# Patient Record
Sex: MALE | Race: BLACK OR AFRICAN AMERICAN | NOT HISPANIC OR LATINO | URBAN - METROPOLITAN AREA
[De-identification: names, ages, dates, MRNs, and addresses within clinical notes are randomized per-mention and may not be internally consistent; named-entity substitution may affect disease eponyms.]

---

## 2021-06-30 ENCOUNTER — EMERGENCY (EMERGENCY)
Facility: HOSPITAL | Age: 31
LOS: 0 days | Discharge: HOME | End: 2021-06-30
Attending: EMERGENCY MEDICINE | Admitting: EMERGENCY MEDICINE
Payer: COMMERCIAL

## 2021-06-30 VITALS
DIASTOLIC BLOOD PRESSURE: 58 MMHG | HEART RATE: 84 BPM | TEMPERATURE: 98 F | SYSTOLIC BLOOD PRESSURE: 126 MMHG | WEIGHT: 291.89 LBS

## 2021-06-30 VITALS
TEMPERATURE: 98 F | OXYGEN SATURATION: 96 % | WEIGHT: 300.05 LBS | SYSTOLIC BLOOD PRESSURE: 126 MMHG | HEIGHT: 71 IN | DIASTOLIC BLOOD PRESSURE: 78 MMHG | HEART RATE: 100 BPM | RESPIRATION RATE: 20 BRPM

## 2021-06-30 DIAGNOSIS — R35.8 OTHER POLYURIA: ICD-10-CM

## 2021-06-30 DIAGNOSIS — E11.9 TYPE 2 DIABETES MELLITUS WITHOUT COMPLICATIONS: ICD-10-CM

## 2021-06-30 DIAGNOSIS — R10.9 UNSPECIFIED ABDOMINAL PAIN: ICD-10-CM

## 2021-06-30 DIAGNOSIS — F17.200 NICOTINE DEPENDENCE, UNSPECIFIED, UNCOMPLICATED: ICD-10-CM

## 2021-06-30 DIAGNOSIS — E11.65 TYPE 2 DIABETES MELLITUS WITH HYPERGLYCEMIA: ICD-10-CM

## 2021-06-30 LAB
ALBUMIN SERPL ELPH-MCNC: 4.4 G/DL — SIGNIFICANT CHANGE UP (ref 3.5–5.2)
ALP SERPL-CCNC: 100 U/L — SIGNIFICANT CHANGE UP (ref 30–115)
ALT FLD-CCNC: 36 U/L — SIGNIFICANT CHANGE UP (ref 0–41)
ANION GAP SERPL CALC-SCNC: 11 MMOL/L — SIGNIFICANT CHANGE UP (ref 7–14)
ANION GAP SERPL CALC-SCNC: 14 MMOL/L — SIGNIFICANT CHANGE UP (ref 7–14)
APPEARANCE UR: CLEAR — SIGNIFICANT CHANGE UP
AST SERPL-CCNC: 33 U/L — SIGNIFICANT CHANGE UP (ref 0–41)
B-OH-BUTYR SERPL-SCNC: 1 MMOL/L — HIGH
BASE EXCESS BLDV CALC-SCNC: 1.6 MMOL/L — SIGNIFICANT CHANGE UP (ref -2–2)
BASOPHILS # BLD AUTO: 0.05 K/UL — SIGNIFICANT CHANGE UP (ref 0–0.2)
BASOPHILS NFR BLD AUTO: 0.7 % — SIGNIFICANT CHANGE UP (ref 0–1)
BILIRUB SERPL-MCNC: 0.7 MG/DL — SIGNIFICANT CHANGE UP (ref 0.2–1.2)
BILIRUB UR-MCNC: NEGATIVE — SIGNIFICANT CHANGE UP
BUN SERPL-MCNC: 18 MG/DL — SIGNIFICANT CHANGE UP (ref 10–20)
BUN SERPL-MCNC: 19 MG/DL — SIGNIFICANT CHANGE UP (ref 10–20)
CA-I SERPL-SCNC: 1.22 MMOL/L — SIGNIFICANT CHANGE UP (ref 1.12–1.3)
CALCIUM SERPL-MCNC: 10 MG/DL — SIGNIFICANT CHANGE UP (ref 8.5–10.1)
CALCIUM SERPL-MCNC: 9.6 MG/DL — SIGNIFICANT CHANGE UP (ref 8.5–10.1)
CHLORIDE SERPL-SCNC: 88 MMOL/L — LOW (ref 98–110)
CHLORIDE SERPL-SCNC: 94 MMOL/L — LOW (ref 98–110)
CO2 SERPL-SCNC: 23 MMOL/L — SIGNIFICANT CHANGE UP (ref 17–32)
CO2 SERPL-SCNC: 26 MMOL/L — SIGNIFICANT CHANGE UP (ref 17–32)
COLOR SPEC: COLORLESS — SIGNIFICANT CHANGE UP
CREAT SERPL-MCNC: 1.3 MG/DL — SIGNIFICANT CHANGE UP (ref 0.7–1.5)
CREAT SERPL-MCNC: 1.5 MG/DL — SIGNIFICANT CHANGE UP (ref 0.7–1.5)
DIFF PNL FLD: NEGATIVE — SIGNIFICANT CHANGE UP
EOSINOPHIL # BLD AUTO: 0.3 K/UL — SIGNIFICANT CHANGE UP (ref 0–0.7)
EOSINOPHIL NFR BLD AUTO: 4.4 % — SIGNIFICANT CHANGE UP (ref 0–8)
GAS PNL BLDV: 130 MMOL/L — LOW (ref 136–145)
GAS PNL BLDV: SIGNIFICANT CHANGE UP
GLUCOSE SERPL-MCNC: 442 MG/DL — HIGH (ref 70–99)
GLUCOSE SERPL-MCNC: 592 MG/DL — CRITICAL HIGH (ref 70–99)
GLUCOSE UR QL: ABNORMAL
HCO3 BLDV-SCNC: 27 MMOL/L — SIGNIFICANT CHANGE UP (ref 22–29)
HCT VFR BLD CALC: 43.6 % — SIGNIFICANT CHANGE UP (ref 42–52)
HCT VFR BLDA CALC: 46.9 % — HIGH (ref 34–44)
HGB BLD CALC-MCNC: 15.3 G/DL — SIGNIFICANT CHANGE UP (ref 14–18)
HGB BLD-MCNC: 14.6 G/DL — SIGNIFICANT CHANGE UP (ref 14–18)
IMM GRANULOCYTES NFR BLD AUTO: 0.1 % — SIGNIFICANT CHANGE UP (ref 0.1–0.3)
KETONES UR-MCNC: ABNORMAL
LACTATE BLDV-MCNC: 1.9 MMOL/L — HIGH (ref 0.5–1.6)
LEUKOCYTE ESTERASE UR-ACNC: NEGATIVE — SIGNIFICANT CHANGE UP
LYMPHOCYTES # BLD AUTO: 2.2 K/UL — SIGNIFICANT CHANGE UP (ref 1.2–3.4)
LYMPHOCYTES # BLD AUTO: 32.5 % — SIGNIFICANT CHANGE UP (ref 20.5–51.1)
MCHC RBC-ENTMCNC: 26.5 PG — LOW (ref 27–31)
MCHC RBC-ENTMCNC: 33.5 G/DL — SIGNIFICANT CHANGE UP (ref 32–37)
MCV RBC AUTO: 79.1 FL — LOW (ref 80–94)
MONOCYTES # BLD AUTO: 0.51 K/UL — SIGNIFICANT CHANGE UP (ref 0.1–0.6)
MONOCYTES NFR BLD AUTO: 7.5 % — SIGNIFICANT CHANGE UP (ref 1.7–9.3)
NEUTROPHILS # BLD AUTO: 3.7 K/UL — SIGNIFICANT CHANGE UP (ref 1.4–6.5)
NEUTROPHILS NFR BLD AUTO: 54.8 % — SIGNIFICANT CHANGE UP (ref 42.2–75.2)
NITRITE UR-MCNC: NEGATIVE — SIGNIFICANT CHANGE UP
NRBC # BLD: 0 /100 WBCS — SIGNIFICANT CHANGE UP (ref 0–0)
PCO2 BLDV: 45 MMHG — SIGNIFICANT CHANGE UP (ref 41–51)
PH BLDV: 7.39 — SIGNIFICANT CHANGE UP (ref 7.26–7.43)
PH UR: 6 — SIGNIFICANT CHANGE UP (ref 5–8)
PLATELET # BLD AUTO: 276 K/UL — SIGNIFICANT CHANGE UP (ref 130–400)
PO2 BLDV: 81 MMHG — HIGH (ref 20–40)
POTASSIUM BLDV-SCNC: 4.8 MMOL/L — SIGNIFICANT CHANGE UP (ref 3.3–5.6)
POTASSIUM SERPL-MCNC: 4.6 MMOL/L — SIGNIFICANT CHANGE UP (ref 3.5–5)
POTASSIUM SERPL-MCNC: 6.5 MMOL/L — CRITICAL HIGH (ref 3.5–5)
POTASSIUM SERPL-SCNC: 4.6 MMOL/L — SIGNIFICANT CHANGE UP (ref 3.5–5)
POTASSIUM SERPL-SCNC: 6.5 MMOL/L — CRITICAL HIGH (ref 3.5–5)
PROT SERPL-MCNC: 8.2 G/DL — HIGH (ref 6–8)
PROT UR-MCNC: NEGATIVE — SIGNIFICANT CHANGE UP
RBC # BLD: 5.51 M/UL — SIGNIFICANT CHANGE UP (ref 4.7–6.1)
RBC # FLD: 11.6 % — SIGNIFICANT CHANGE UP (ref 11.5–14.5)
SAO2 % BLDV: 94 % — SIGNIFICANT CHANGE UP
SODIUM SERPL-SCNC: 125 MMOL/L — LOW (ref 135–146)
SODIUM SERPL-SCNC: 131 MMOL/L — LOW (ref 135–146)
SP GR SPEC: 1.03 — SIGNIFICANT CHANGE UP (ref 1.01–1.03)
UROBILINOGEN FLD QL: SIGNIFICANT CHANGE UP
WBC # BLD: 6.77 K/UL — SIGNIFICANT CHANGE UP (ref 4.8–10.8)
WBC # FLD AUTO: 6.77 K/UL — SIGNIFICANT CHANGE UP (ref 4.8–10.8)

## 2021-06-30 PROCEDURE — 99053 MED SERV 10PM-8AM 24 HR FAC: CPT

## 2021-06-30 PROCEDURE — 99285 EMERGENCY DEPT VISIT HI MDM: CPT

## 2021-06-30 PROCEDURE — 93010 ELECTROCARDIOGRAM REPORT: CPT

## 2021-06-30 RX ORDER — SODIUM CHLORIDE 9 MG/ML
1000 INJECTION, SOLUTION INTRAVENOUS ONCE
Refills: 0 | Status: COMPLETED | OUTPATIENT
Start: 2021-06-30 | End: 2021-06-30

## 2021-06-30 RX ORDER — METFORMIN HYDROCHLORIDE 850 MG/1
850 TABLET ORAL ONCE
Refills: 0 | Status: COMPLETED | OUTPATIENT
Start: 2021-06-30 | End: 2021-06-30

## 2021-06-30 RX ORDER — METFORMIN HYDROCHLORIDE 850 MG/1
1 TABLET ORAL
Qty: 60 | Refills: 0
Start: 2021-06-30 | End: 2021-07-29

## 2021-06-30 RX ADMIN — SODIUM CHLORIDE 1000 MILLILITER(S): 9 INJECTION, SOLUTION INTRAVENOUS at 06:15

## 2021-06-30 RX ADMIN — SODIUM CHLORIDE 1000 MILLILITER(S): 9 INJECTION, SOLUTION INTRAVENOUS at 04:04

## 2021-06-30 RX ADMIN — METFORMIN HYDROCHLORIDE 850 MILLIGRAM(S): 850 TABLET ORAL at 06:43

## 2021-06-30 NOTE — ED PROVIDER NOTE - PROVIDER TOKENS
PROVIDER:[TOKEN:[[id number]],FOLLOWUP:[1-3 Days]],PROVIDER:[TOKEN:[[id number]],FOLLOWUP:[1-3 Days]]

## 2021-06-30 NOTE — ED PROVIDER NOTE - OBJECTIVE STATEMENT
32 y/o male with no significant PMH presents to the ED for evaluation of polyuria, and polydipsia x 4 days. pt reports his mother has diabetes. pt reports he had abdominal discomfort earlier and felt nauseous. pt denies fever, chills, chest pain, sob, vomiting, diarrhea, constipation, burning or pain with urination, blood in the urine, headache, dizziness, rashes, recent illness, hx of autoimmune disease, or weakness.

## 2021-06-30 NOTE — ED ADULT NURSE NOTE - OBJECTIVE STATEMENT
Pt. is a 31yr male presenting to the ED For complaints of increased thirst, urination, and abdominal pain since saturday 6/27, pt. states that he experiences extreme thirst and PO fluid intake and immediately urinates with a chronic feeling of thirst. Pt. denies any medical hx but states that his mother does have DM that is well managed. Pt. denies any fevers/chills, chest pain or SOB.

## 2021-06-30 NOTE — ED PROVIDER NOTE - CARE PROVIDER_API CALL
Katrina Barba  ENDOCRINOLOGY/METAB/DIABETES  [address] [place] [address]  Phone: [phone]  Fax: [phone]  Follow Up Time: 1-3 Days    Jeanie Shannon  INTERNAL MEDICINE  [facility] [place] [address]  Phone: [phone]  Fax: [phone]  Follow Up Time: 1-3 Days

## 2021-06-30 NOTE — ED PROVIDER NOTE - PHYSICAL EXAMINATION
Physical Exam    Vital Signs: I have reviewed the initial vital signs.  Constitutional: well-nourished, appears stated age, no acute distress  Eyes: Conjunctiva pink, Sclera clear  Cardiovascular: S1 and S2, regular rate, regular rhythm, well-perfused extremities, radial pulses equal and 2+ b/l.   Respiratory: unlabored respiratory effort, clear to auscultation bilaterally no wheezing, rales and rhonchi. pt is speaking full sentences. no accessory muscle use.   Gastrointestinal: soft, non-tender, nondistended abdomen, no pulsatile mass, normal bowl sounds, no rebound, no guarding, no organomegaly.   Musculoskeletal: supple neck, no lower extremity edema, no calf tenderness  Integumentary: warm, dry, no rash  Neurologic: awake, alert  Psychiatric: appropriate mood, appropriate affect

## 2021-06-30 NOTE — ED PROVIDER NOTE - CARE PROVIDERS DIRECT ADDRESSES
,DirectAddress_Unknown,[email or web address]

## 2021-06-30 NOTE — ED PROVIDER NOTE - CLINICAL SUMMARY MEDICAL DECISION MAKING FREE TEXT BOX
30 yo male with PMH of well controlled gout presents to the ER fo r4 days of increased thirst, increased urine output (clear urine), nausea and some intermittent abdomen discomfort. Pt states he has no vomiting/diarrhea/CP/SOB/cough/fever/chills/rash/neck pain/HA/dizziness/leg swelling/calf pain/palpitations/dysuria/hematuria/weakness. On exam pt is an obese male in NAD, NCAT, PERRL, EOMI, Lungs CTAB, Heart Regular S1S2, Abdomen soft NT/ND +BS, no mass, no rebound or guarding, Ext no edema or calf tenderness, Neuro intact. FS in . Has FH of DM (mom). Concerned about Hyperglycemia/DKA given complaints therefore ordered labs (vbg for pH, UA, beta hydroxybutyrate, chem panel). Pt however does not appear to be in DKA given results but does clearly have hyperglycemia cw diabetes. Given 3 L IVFs, which did bring down glucose, but will need to be started on oral hypoglycemics and pt was educated today about diet, exercise, weight management, and follow up with endocrinologist as an outpatient. Pt to start on Metformin 850 bid and to return to ER for any worsening of his symptoms.

## 2021-06-30 NOTE — ED ADULT NURSE NOTE - PAIN: PRESENCE, MLM
denies pain/discomfort Quality 110: Preventive Care And Screening: Influenza Immunization: Influenza Immunization Administered during Influenza season Detail Level: Detailed

## 2021-06-30 NOTE — ED PROVIDER NOTE - ATTENDING CONTRIBUTION TO CARE
30 yo male with PMH of well controlled gout presents to the ER fo r4 days of increased thirst, increased urine output (clear urine), nausea and some intermittent abdomen discomfort. Pt states he has no vomiting/diarrhea/CP/SOB/cough/fever/chills/rash/neck pain/HA/dizziness/leg swelling/calf pain/palpitations/dysuria/hematuria/weakness. On exam pt is an obese male in NAD, NCAT, PERRL, EOMI, Lungs CTAB, Heart Regular S1S2, Abdomen soft NT/ND +BS, no mass, no rebound or guarding, Ext no edema or calf tenderness, Neuro intact. FS in . Has FH of DM (mom). Concerned about Hyperglycemia/DKA given complaints therefore ordered labs (vbg for pH, UA, beta hydroxybutyrate, chem panel). Pt however does not appear to be in DKA given results but does clearly have hyperglycemia cw diabetes. Given 3 L IVFs, which did bring down glucose, but will need to be started on oral hypoglycemics and pt was educated today about diet, exercise, weight management, and follow up with endocrinologist as an outpatient. Pt to start on Metformin 850 bid and to return to ER for any worsening of his symptoms.     ALL: nkda  PMH gout  Meds denies  SH: +smoking, No etoh  PMD denies

## 2021-06-30 NOTE — ED PROVIDER NOTE - CARE PLAN
Principal Discharge DX:	Hyperglycemia   Principal Discharge DX:	Hyperglycemia  Secondary Diagnosis:	Diabetes

## 2021-06-30 NOTE — ED PROVIDER NOTE - PATIENT PORTAL LINK FT
You can access the FollowMyHealth Patient Portal offered by [facility] by registering at the following website: [email or web address]. By joining [employer]’s FollowMyHealth portal, you will also be able to view your health information using other applications (apps) compatible with our system.

## 2021-06-30 NOTE — ED PROVIDER NOTE - NS ED ROS FT
CONST: No fever, chills or bodyaches  EYES: No pain, redness, drainage or visual changes.  ENT: No ear pain or discharge, nasal discharge or congestion. No sore throat  CARD: No chest pain, palpitations  RESP: No SOB, cough, hemoptysis. No hx of asthma or COPD  GI: No abdominal pain, N/V/D  : (+) polyuria, polydipsia  MS: No joint pain, back pain or extremity pain/injury  SKIN: No rashes  NEURO: No headache, dizziness, paresthesias or LOC

## 2021-06-30 NOTE — ED PROVIDER NOTE - PROGRESS NOTE DETAILS
FF: discussed lab results with pt. pt given metformin. rx metformin. discussed dietary changes with pt. advised pt to f/u with endocrinologist. advised of strict return precautions discussed at bedside. agreeable to dc.

## 2021-06-30 NOTE — ED PROVIDER NOTE - NSFOLLOWUPINSTRUCTIONS_ED_ALL_ED_FT
Hyperglycemia  Hyperglycemia occurs when the level of sugar (glucose) in the blood is too high. Glucose is a type of sugar that provides the body's main source of energy. Certain hormones (insulin and glucagon) control the level of glucose in the blood. Insulin lowers blood glucose, and glucagon increases blood glucose. Hyperglycemia can result from having too little insulin in the bloodstream, or from the body not responding normally to insulin.  Hyperglycemia occurs most often in people who have diabetes (diabetes mellitus), but it can happen in people who do not have diabetes. It can develop quickly, and it can be life-threatening if it causes you to become severely dehydrated (diabetic ketoacidosis or hyperglycemic hyperosmolar state). Severe hyperglycemia is a medical emergency.  What are the causes?  If you have diabetes, hyperglycemia may be caused by:  Diabetes medicine.Medicines that increase blood glucose or affect your diabetes control.Not eating enough, or not eating often enough.Changes in physical activity level.Being sick or having an infection.If you have prediabetes or undiagnosed diabetes:  Hyperglycemia may be caused by those conditions.If you do not have diabetes, hyperglycemia may be caused by:  Certain medicines, including steroid medicines, beta-blockers, epinephrine, and thiazide diuretics.Stress.Serious illness.Surgery.Diseases of the pancreas.Infection.What increases the risk?  Hyperglycemia is more likely to develop in people who have risk factors for diabetes, such as:  Having a family member with diabetes.Having a gene for type 1 diabetes that is passed from parent to child (inherited).Living in an area with cold weather conditions.Exposure to certain viruses.Certain conditions in which the body's disease-fighting (immune) system attacks itself (autoimmune disorders).Being overweight or obese.Having an inactive (sedentary) lifestyle.Having been diagnosed with insulin resistance.Having a history of prediabetes, gestational diabetes, or polycystic ovarian syndrome (PCOS).Being of American-[place], -American, /, or / descent.What are the signs or symptoms?  Hyperglycemia may not cause any symptoms. If you do have symptoms, they may include early warning signs, such as:  Increased thirst.Hunger.Feeling very tired.Needing to urinate more often than usual.Blurry vision.Other symptoms may develop if hyperglycemia gets worse, such as:  Dry mouth.Loss of appetite.Fruity-smelling breath.Weakness.Unexpected or rapid weight gain or weight loss.Tingling or numbness in the hands or feet.Headache.Skin that does not quickly return to normal after being lightly pinched and released (poor skin turgor).Abdominal pain.Cuts or bruises that are slow to heal.How is this diagnosed?  Hyperglycemia is diagnosed with a blood test to measure your blood glucose level. This blood test is usually done while you are having symptoms. Your health care provider may also do a physical exam and review your medical history.  You may have more tests to determine the cause of your hyperglycemia, such as:  A fasting blood glucose (FBG) test. You will not be allowed to eat (you will fast) for at least 8 hours before a blood sample is taken.An A1c (hemoglobin A1c) blood test. This provides information about blood glucose control over the previous 2–3 months.An oral glucose tolerance test (OGTT). This measures your blood glucose at two times:  After fasting. This is your baseline blood glucose level.Two hours after drinking a beverage that contains glucose.How is this treated?  Treatment depends on the cause of your hyperglycemia. Treatment may include:  Taking medicine to regulate your blood glucose levels. If you take insulin or other diabetes medicines, your medicine or dosage may be adjusted.Lifestyle changes, such as exercising more, eating healthier foods, or losing weight.Treating an illness or infection, if this caused your hyperglycemia.Checking your blood glucose more often.Stopping or reducing steroid medicines, if these caused your hyperglycemia.If your hyperglycemia becomes severe and it results in hyperglycemic hyperosmolar state, you must be hospitalized and given IV fluids.  Follow these instructions at home:  Image   General instructions     Take over-the-counter and prescription medicines only as told by your health care provider.Do not use any products that contain nicotine or tobacco, such as cigarettes and e-cigarettes. If you need help quitting, ask your health care provider.Limit alcohol intake to no more than 1 drink per day for nonpregnant women and 2 drinks per day for men. One drink equals 12 oz of beer, 5 oz of wine, or 1½ oz of hard liquor.Learn to manage stress. If you need help with this, ask your health care provider.Keep all follow-up visits as told by your health care provider. This is important.Eating and drinking     Image   Maintain a healthy weight.Exercise regularly, as directed by your health care provider.Stay hydrated, especially when you exercise, get sick, or spend time in hot temperatures.Eat healthy foods, such as:  Lean proteins.Complex carbohydrates.Fresh fruits and vegetables.Low-fat dairy products.Healthy fats.Drink enough fluid to keep your urine clear or pale yellow.If you have diabetes:     Make sure you know the symptoms of hyperglycemia.Follow your diabetes management plan, as told by your health care provider. Make sure you:  Take your insulin and medicines as directed.Follow your exercise plan.Follow your meal plan. Eat on time, and do not skip meals.Check your blood glucose as often as directed. Make sure to check your blood glucose before and after exercise. If you exercise longer or in a different way than usual, check your blood glucose more often.Follow your sick day plan whenever you cannot eat or drink normally. Make this plan in advance with your health care provider.Share your diabetes management plan with people in your workplace, school, and household.Check your urine for ketones when you are ill and as told by your health care provider.Carry a medical alert card or wear medical alert jewelry.Contact a health care provider if:  Your blood glucose is at or above 240 mg/dL (13.3 mmol/L) for 2 days in a row.You have problems keeping your blood glucose in your target range.You have frequent episodes of hyperglycemia.Get help right away if:  You have difficulty breathing.You have a change in how you think, feel, or act (mental status).You have nausea or vomiting that does not go away.These symptoms may represent a serious problem that is an emergency. Do not wait to see if the symptoms will go away. Get medical help right away. Call your local emergency services (911 in the [place] Do not drive yourself to the hospital.   Summary  Hyperglycemia occurs when the level of sugar (glucose) in the blood is too high.Hyperglycemia is diagnosed with a blood test to measure your blood glucose level. This blood test is usually done while you are having symptoms. Your health care provider may also do a physical exam and review your medical history.If you have diabetes, follow your diabetes management plan as told by your health care provider.Contact your health care provider if you have problems keeping your blood glucose in your target range.

## 2021-07-14 ENCOUNTER — INPATIENT (INPATIENT)
Facility: HOSPITAL | Age: 31
LOS: 2 days | Discharge: HOME HEALTH | End: 2021-07-17
Attending: INTERNAL MEDICINE | Admitting: INTERNAL MEDICINE
Payer: COMMERCIAL

## 2021-07-14 VITALS
RESPIRATION RATE: 16 BRPM | HEIGHT: 71 IN | HEART RATE: 114 BPM | TEMPERATURE: 97 F | OXYGEN SATURATION: 99 % | DIASTOLIC BLOOD PRESSURE: 79 MMHG | SYSTOLIC BLOOD PRESSURE: 127 MMHG

## 2021-07-14 LAB
ALBUMIN SERPL ELPH-MCNC: 4.8 G/DL — SIGNIFICANT CHANGE UP (ref 3.5–5.2)
ALBUMIN SERPL ELPH-MCNC: 5 G/DL — SIGNIFICANT CHANGE UP (ref 3.5–5.2)
ALP SERPL-CCNC: 100 U/L — SIGNIFICANT CHANGE UP (ref 30–115)
ALP SERPL-CCNC: 92 U/L — SIGNIFICANT CHANGE UP (ref 30–115)
ALT FLD-CCNC: 19 U/L — SIGNIFICANT CHANGE UP (ref 0–41)
ALT FLD-CCNC: 22 U/L — SIGNIFICANT CHANGE UP (ref 0–41)
ANION GAP SERPL CALC-SCNC: 21 MMOL/L — HIGH (ref 7–14)
ANION GAP SERPL CALC-SCNC: 28 MMOL/L — HIGH (ref 7–14)
APPEARANCE UR: CLEAR — SIGNIFICANT CHANGE UP
AST SERPL-CCNC: 20 U/L — SIGNIFICANT CHANGE UP (ref 0–41)
AST SERPL-CCNC: 9 U/L — SIGNIFICANT CHANGE UP (ref 0–41)
B-OH-BUTYR SERPL-SCNC: 7.5 MMOL/L — HIGH
BASE EXCESS BLDV CALC-SCNC: -11.3 MMOL/L — LOW (ref -2–2)
BASE EXCESS BLDV CALC-SCNC: -8.9 MMOL/L — LOW (ref -2–2)
BASOPHILS # BLD AUTO: 0.05 K/UL — SIGNIFICANT CHANGE UP (ref 0–0.2)
BASOPHILS NFR BLD AUTO: 0.6 % — SIGNIFICANT CHANGE UP (ref 0–1)
BILIRUB SERPL-MCNC: 0.5 MG/DL — SIGNIFICANT CHANGE UP (ref 0.2–1.2)
BILIRUB SERPL-MCNC: 0.6 MG/DL — SIGNIFICANT CHANGE UP (ref 0.2–1.2)
BILIRUB UR-MCNC: NEGATIVE — SIGNIFICANT CHANGE UP
BUN SERPL-MCNC: 19 MG/DL — SIGNIFICANT CHANGE UP (ref 10–20)
BUN SERPL-MCNC: 24 MG/DL — HIGH (ref 10–20)
CA-I SERPL-SCNC: 1.2 MMOL/L — SIGNIFICANT CHANGE UP (ref 1.12–1.3)
CA-I SERPL-SCNC: 1.32 MMOL/L — HIGH (ref 1.12–1.3)
CALCIUM SERPL-MCNC: 10.3 MG/DL — HIGH (ref 8.5–10.1)
CALCIUM SERPL-MCNC: 10.5 MG/DL — HIGH (ref 8.5–10.1)
CHLORIDE SERPL-SCNC: 88 MMOL/L — LOW (ref 98–110)
CHLORIDE SERPL-SCNC: 96 MMOL/L — LOW (ref 98–110)
CO2 SERPL-SCNC: 12 MMOL/L — LOW (ref 17–32)
CO2 SERPL-SCNC: 18 MMOL/L — SIGNIFICANT CHANGE UP (ref 17–32)
COLOR SPEC: COLORLESS — SIGNIFICANT CHANGE UP
CREAT SERPL-MCNC: 1.6 MG/DL — HIGH (ref 0.7–1.5)
CREAT SERPL-MCNC: 1.9 MG/DL — HIGH (ref 0.7–1.5)
DIFF PNL FLD: NEGATIVE — SIGNIFICANT CHANGE UP
EOSINOPHIL # BLD AUTO: 0.08 K/UL — SIGNIFICANT CHANGE UP (ref 0–0.7)
EOSINOPHIL NFR BLD AUTO: 0.9 % — SIGNIFICANT CHANGE UP (ref 0–8)
GAS PNL BLDV: 127 MMOL/L — LOW (ref 136–145)
GAS PNL BLDV: 137 MMOL/L — SIGNIFICANT CHANGE UP (ref 136–145)
GAS PNL BLDV: SIGNIFICANT CHANGE UP
GAS PNL BLDV: SIGNIFICANT CHANGE UP
GLUCOSE BLDC GLUCOMTR-MCNC: 221 MG/DL — HIGH (ref 70–99)
GLUCOSE BLDC GLUCOMTR-MCNC: 230 MG/DL — HIGH (ref 70–99)
GLUCOSE BLDC GLUCOMTR-MCNC: 232 MG/DL — HIGH (ref 70–99)
GLUCOSE BLDC GLUCOMTR-MCNC: 250 MG/DL — HIGH (ref 70–99)
GLUCOSE BLDC GLUCOMTR-MCNC: 276 MG/DL — HIGH (ref 70–99)
GLUCOSE BLDC GLUCOMTR-MCNC: 337 MG/DL — HIGH (ref 70–99)
GLUCOSE BLDC GLUCOMTR-MCNC: 403 MG/DL — HIGH (ref 70–99)
GLUCOSE SERPL-MCNC: 239 MG/DL — HIGH (ref 70–99)
GLUCOSE SERPL-MCNC: 537 MG/DL — CRITICAL HIGH (ref 70–99)
GLUCOSE UR QL: ABNORMAL
HCO3 BLDV-SCNC: 14 MMOL/L — LOW (ref 22–29)
HCO3 BLDV-SCNC: 19 MMOL/L — LOW (ref 22–29)
HCT VFR BLD CALC: 46 % — SIGNIFICANT CHANGE UP (ref 42–52)
HCT VFR BLDA CALC: 49.4 % — HIGH (ref 34–44)
HCT VFR BLDA CALC: 50.5 % — HIGH (ref 34–44)
HGB BLD CALC-MCNC: 16.1 G/DL — SIGNIFICANT CHANGE UP (ref 14–18)
HGB BLD CALC-MCNC: 16.5 G/DL — SIGNIFICANT CHANGE UP (ref 14–18)
HGB BLD-MCNC: 15.1 G/DL — SIGNIFICANT CHANGE UP (ref 14–18)
IMM GRANULOCYTES NFR BLD AUTO: 0.2 % — SIGNIFICANT CHANGE UP (ref 0.1–0.3)
KETONES UR-MCNC: ABNORMAL
LACTATE BLDV-MCNC: 2.2 MMOL/L — HIGH (ref 0.5–1.6)
LACTATE BLDV-MCNC: 2.5 MMOL/L — HIGH (ref 0.5–1.6)
LEUKOCYTE ESTERASE UR-ACNC: NEGATIVE — SIGNIFICANT CHANGE UP
LYMPHOCYTES # BLD AUTO: 1.59 K/UL — SIGNIFICANT CHANGE UP (ref 1.2–3.4)
LYMPHOCYTES # BLD AUTO: 18.4 % — LOW (ref 20.5–51.1)
MAGNESIUM SERPL-MCNC: 2.4 MG/DL — SIGNIFICANT CHANGE UP (ref 1.8–2.4)
MCHC RBC-ENTMCNC: 26.4 PG — LOW (ref 27–31)
MCHC RBC-ENTMCNC: 32.8 G/DL — SIGNIFICANT CHANGE UP (ref 32–37)
MCV RBC AUTO: 80.6 FL — SIGNIFICANT CHANGE UP (ref 80–94)
MONOCYTES # BLD AUTO: 0.61 K/UL — HIGH (ref 0.1–0.6)
MONOCYTES NFR BLD AUTO: 7 % — SIGNIFICANT CHANGE UP (ref 1.7–9.3)
NEUTROPHILS # BLD AUTO: 6.31 K/UL — SIGNIFICANT CHANGE UP (ref 1.4–6.5)
NEUTROPHILS NFR BLD AUTO: 72.9 % — SIGNIFICANT CHANGE UP (ref 42.2–75.2)
NITRITE UR-MCNC: NEGATIVE — SIGNIFICANT CHANGE UP
NRBC # BLD: 0 /100 WBCS — SIGNIFICANT CHANGE UP (ref 0–0)
OSMOLALITY SERPL: 326 MOS/KG — HIGH (ref 275–300)
PCO2 BLDV: 31 MMHG — LOW (ref 41–51)
PCO2 BLDV: 45 MMHG — SIGNIFICANT CHANGE UP (ref 41–51)
PH BLDV: 7.22 — LOW (ref 7.26–7.43)
PH BLDV: 7.27 — SIGNIFICANT CHANGE UP (ref 7.26–7.43)
PH UR: 5.5 — SIGNIFICANT CHANGE UP (ref 5–8)
PLATELET # BLD AUTO: 276 K/UL — SIGNIFICANT CHANGE UP (ref 130–400)
PO2 BLDV: 25 MMHG — SIGNIFICANT CHANGE UP (ref 20–40)
PO2 BLDV: 82 MMHG — HIGH (ref 20–40)
POTASSIUM BLDV-SCNC: 10.9 MMOL/L — HIGH (ref 3.3–5.6)
POTASSIUM BLDV-SCNC: 5.6 MMOL/L — SIGNIFICANT CHANGE UP (ref 3.3–5.6)
POTASSIUM SERPL-MCNC: 4.6 MMOL/L — SIGNIFICANT CHANGE UP (ref 3.5–5)
POTASSIUM SERPL-MCNC: 6.6 MMOL/L — CRITICAL HIGH (ref 3.5–5)
POTASSIUM SERPL-SCNC: 4.6 MMOL/L — SIGNIFICANT CHANGE UP (ref 3.5–5)
POTASSIUM SERPL-SCNC: 6.6 MMOL/L — CRITICAL HIGH (ref 3.5–5)
PROT SERPL-MCNC: 7.8 G/DL — SIGNIFICANT CHANGE UP (ref 6–8)
PROT SERPL-MCNC: 8.3 G/DL — HIGH (ref 6–8)
PROT UR-MCNC: NEGATIVE — SIGNIFICANT CHANGE UP
RBC # BLD: 5.71 M/UL — SIGNIFICANT CHANGE UP (ref 4.7–6.1)
RBC # FLD: 12.2 % — SIGNIFICANT CHANGE UP (ref 11.5–14.5)
SAO2 % BLDV: 33 % — SIGNIFICANT CHANGE UP
SAO2 % BLDV: 95 % — SIGNIFICANT CHANGE UP
SARS-COV-2 RNA SPEC QL NAA+PROBE: SIGNIFICANT CHANGE UP
SODIUM SERPL-SCNC: 128 MMOL/L — LOW (ref 135–146)
SODIUM SERPL-SCNC: 135 MMOL/L — SIGNIFICANT CHANGE UP (ref 135–146)
SP GR SPEC: 1.03 — SIGNIFICANT CHANGE UP (ref 1.01–1.03)
UROBILINOGEN FLD QL: SIGNIFICANT CHANGE UP
WBC # BLD: 8.66 K/UL — SIGNIFICANT CHANGE UP (ref 4.8–10.8)
WBC # FLD AUTO: 8.66 K/UL — SIGNIFICANT CHANGE UP (ref 4.8–10.8)

## 2021-07-14 PROCEDURE — 71046 X-RAY EXAM CHEST 2 VIEWS: CPT | Mod: 26

## 2021-07-14 PROCEDURE — 93010 ELECTROCARDIOGRAM REPORT: CPT

## 2021-07-14 PROCEDURE — 71046 X-RAY EXAM CHEST 2 VIEWS: CPT | Mod: 26,77

## 2021-07-14 PROCEDURE — 99291 CRITICAL CARE FIRST HOUR: CPT

## 2021-07-14 RX ORDER — ONDANSETRON 8 MG/1
4 TABLET, FILM COATED ORAL EVERY 8 HOURS
Refills: 0 | Status: DISCONTINUED | OUTPATIENT
Start: 2021-07-14 | End: 2021-07-17

## 2021-07-14 RX ORDER — SODIUM CHLORIDE 9 MG/ML
1000 INJECTION INTRAMUSCULAR; INTRAVENOUS; SUBCUTANEOUS
Refills: 0 | Status: DISCONTINUED | OUTPATIENT
Start: 2021-07-14 | End: 2021-07-14

## 2021-07-14 RX ORDER — HEPARIN SODIUM 5000 [USP'U]/ML
5000 INJECTION INTRAVENOUS; SUBCUTANEOUS EVERY 12 HOURS
Refills: 0 | Status: DISCONTINUED | OUTPATIENT
Start: 2021-07-14 | End: 2021-07-17

## 2021-07-14 RX ORDER — SODIUM CHLORIDE 9 MG/ML
1000 INJECTION, SOLUTION INTRAVENOUS ONCE
Refills: 0 | Status: COMPLETED | OUTPATIENT
Start: 2021-07-14 | End: 2021-07-14

## 2021-07-14 RX ORDER — CHLORHEXIDINE GLUCONATE 213 G/1000ML
1 SOLUTION TOPICAL
Refills: 0 | Status: DISCONTINUED | OUTPATIENT
Start: 2021-07-14 | End: 2021-07-17

## 2021-07-14 RX ORDER — SODIUM CHLORIDE 9 MG/ML
1000 INJECTION, SOLUTION INTRAVENOUS
Refills: 0 | Status: DISCONTINUED | OUTPATIENT
Start: 2021-07-14 | End: 2021-07-15

## 2021-07-14 RX ORDER — INSULIN HUMAN 100 [IU]/ML
7 INJECTION, SOLUTION SUBCUTANEOUS
Qty: 100 | Refills: 0 | Status: DISCONTINUED | OUTPATIENT
Start: 2021-07-14 | End: 2021-07-15

## 2021-07-14 RX ORDER — SODIUM CHLORIDE 9 MG/ML
1000 INJECTION, SOLUTION INTRAVENOUS ONCE
Refills: 0 | Status: DISCONTINUED | OUTPATIENT
Start: 2021-07-14 | End: 2021-07-14

## 2021-07-14 RX ADMIN — SODIUM CHLORIDE 1000 MILLILITER(S): 9 INJECTION, SOLUTION INTRAVENOUS at 15:56

## 2021-07-14 RX ADMIN — HEPARIN SODIUM 5000 UNIT(S): 5000 INJECTION INTRAVENOUS; SUBCUTANEOUS at 22:17

## 2021-07-14 RX ADMIN — SODIUM CHLORIDE 150 MILLILITER(S): 9 INJECTION, SOLUTION INTRAVENOUS at 22:19

## 2021-07-14 RX ADMIN — INSULIN HUMAN 6 UNIT(S)/HR: 100 INJECTION, SOLUTION SUBCUTANEOUS at 22:23

## 2021-07-14 RX ADMIN — INSULIN HUMAN 6 UNIT(S)/HR: 100 INJECTION, SOLUTION SUBCUTANEOUS at 16:35

## 2021-07-14 NOTE — ED PROVIDER NOTE - CLINICAL SUMMARY MEDICAL DECISION MAKING FREE TEXT BOX
32 yo M presented to Ed for rash of face due to acne. Pt started on clindamycin cream in ED and dc home with dermatology fu 32 yo M presented to Ed for weakness and hyperglycemia. Pt was found to be in HHS and insulin drip was started. Pt received fluids. Admitted to ICU for HHS.

## 2021-07-14 NOTE — ED PROVIDER NOTE - PHYSICAL EXAMINATION
Physical Exam    Vital Signs: I have reviewed the initial vital signs.  Constitutional: well-nourished, appears stated age, no acute distress  Eyes: Conjunctiva pink, Sclera clear  Cardiovascular: S1 and S2, regular rate, regular rhythm, well-perfused extremities, radial pulses equal and 2+ b/l.   Respiratory: unlabored respiratory effort, clear to auscultation bilaterally no wheezing, rales and rhonchi. pt is speaking full sentences. no accessory muscle use.   Gastrointestinal: soft, non-tender, nondistended abdomen, no pulsatile mass, normal bowl sounds, no rebound, no guarding, no organomegaly.   Musculoskeletal: supple neck, no lower extremity edema, no calf tenderness, no midline tenderness, no palpable spinal step offs  Integumentary: warm, dry, no rash  Neurologic: awake, alert, cranial nerves II-XII grossly intact, extremities’ motor and sensory functions grossly intact.  Psychiatric: appropriate mood, appropriate affect

## 2021-07-14 NOTE — H&P ADULT - HISTORY OF PRESENT ILLNESS
30 yo M with PMH of DM presents to ED for hyperglycemia and weakness. Pt was in the hospital a couple of weeks ago for hyperglycemia and was dc home on metformin BID. However, he states it causes him constipation and he only takes it once a day. For the past few days he has had weakness and feels more fatigued. No polyuria or polydipsia. Pt had one episode of emesis yesterday and one today. No abdominal pain. No fevers or chills. In the Ed given bolus IVF, started on insulin drip and ns. potassium 6.6, bicarb 12, AG 28 BHB 7.5. Admitted to SD for treatment of DKA.  32 yo M with PMH of DM presents to ED for hyperglycemia and weakness. Pt was in the hospital a couple of weeks ago for hyperglycemia and was dc home on metformin BID. However, he states it causes him constipation and he only takes it once a day. For the past few days he has had weakness and feels more fatigued. No polyuria or polydipsia. Pt had one episode of emesis yesterday and one today. No abdominal pain. No fevers or chills. In the Ed given bolus IVF, started on insulin drip and ns. potassium 6.6, bicarb 12, AG 28 BHB 7.5. Admitted to SD for treatment of DKA. still on insulin drip, 150 CC D5 NS cc/h

## 2021-07-14 NOTE — H&P ADULT - NSHPPHYSICALEXAM_GEN_ALL_CORE
VITALS:   Vital Signs Last 24 Hrs  T(C): 36.7 (14 Jul 2021 18:00), Max: 36.7 (14 Jul 2021 18:00)  T(F): 98 (14 Jul 2021 18:00), Max: 98 (14 Jul 2021 18:00)  HR: 85 (14 Jul 2021 18:00) (85 - 114)  BP: 121/65 (14 Jul 2021 18:00) (113/60 - 127/79)  BP(mean): --  RR: 17 (14 Jul 2021 18:00) (16 - 17)  SpO2: 99% (14 Jul 2021 18:00) (98% - 99%)  I&O's Summary    CAPILLARY BLOOD GLUCOSE      POCT Blood Glucose.: 337 mg/dL (14 Jul 2021 18:14)      PHYSICAL EXAM:  General: WN/WD NAD  Neurology: A&Ox3, nonfocal, PTOTER x 4  Respiratory: CTA B/L, normal respiratory effort, no wheezes, crackles, rales  CV: RRR, S1S2, no murmurs, rubs or gallops  Abdominal: Soft, NT, ND   Extremities: No edema,   Incisions:   Tubes:

## 2021-07-14 NOTE — ED PROVIDER NOTE - ATTENDING CONTRIBUTION TO CARE
30yo M presents to ED for facial rash for months. He states they are white pustules he notices in his beard. He went to a dermatologist a couple of months ago who prescribed him ketoconazole and triamcinolone but he states the rash has not gone away. He puts alcohol in it without relief. No fevers, or chills. No swelling.     Const: Well nourished, well developed, appears stated age  Eyes: PERRL, no conjunctival injection  HENT:  Neck supple without meningismus   CV: RRR, Warm, well-perfused extremities  RESP: CTA B/L, no tachypnea   MSK: No gross deformities appreciated  Skin: Warm, dry. Small pustules in beard. No swelling     Pt has acne s 32 yo M with PMH of DM presents to ED for hyperglycemia and weakness. Pt was in the hospital a couple of weeks ago for hyperglycemia and was dc home on metformin BID. However, he states it causes him constipation and he only takes it once a day. For the past few days he has had weakness and feels more fatigued. No polyuria or polydipsia. Pt had one episode of emesis yesterday and one today. No abdominal pain. No fevers or chills.     Const: Well nourished, well developed, appears stated age  Eyes: PERRL, no conjunctival injection  HENT:  Neck supple without meningismus   CV: RRR, Warm, well-perfused extremities  RESP: CTA B/L, no tachypnea   GI: soft, non-tender, non-distended  MSK: No gross deformities appreciated  Skin: Warm, dry. No rashes  Neuro: Alert, CNs II-XII grossly intact. Sensation and motor function of extremities grossly intact.  Psych: Appropriate mood and affect.    finger stick in triage was high   Concern for DKA vs HHS   Will do labs, CXR, give fluids

## 2021-07-14 NOTE — ED PROVIDER NOTE - OBJECTIVE STATEMENT
30 y/o male with a PMH of newly diagnosed DM on 06/30 not compliant with metformin presents to the ED for evaluation of weakness x 1 week. pt reports he is supposed to take metformin 2x daily, but has only been taking it once daily because when he takes it twice it hurts his butthole. pt reports he tried to take it twice daily yesterday and threw up. pt reports he has lost about 40 pounds since he started the metformin. pt denies fever, chills, chest pain, sob, back pain, abdominal pain, n/v/d/c, urinary symptoms, polyuria, polydipsia, or dizziness.

## 2021-07-14 NOTE — ED ADULT NURSE NOTE - OBJECTIVE STATEMENT
Pt A/OX4 c/o weakness, dizziness and dry mouth, - LOC.  pt tested glucose at home and found it to be in 400s. Denies any N/V/D or fever.

## 2021-07-14 NOTE — H&P ADULT - NSHPLABSRESULTS_GEN_ALL_CORE
LABS:                        15.1   8.66  )-----------( 276      ( 14 Jul 2021 15:22 )             46.0     07-14    128<L>  |  88<L>  |  24<H>  ----------------------------<  537<HH>  6.6<HH>   |  12<L>  |  1.9<H>    Ca    10.5<H>      14 Jul 2021 15:22  Mg     2.4     07-14    TPro  8.3<H>  /  Alb  5.0  /  TBili  0.6  /  DBili  x   /  AST  20  /  ALT  22  /  AlkPhos  100  07-14

## 2021-07-14 NOTE — H&P ADULT - ASSESSMENT
30 yo M with PMH of DM presents to ED for hyperglycemia, admitted for treatment of DKA    # HAGMA likely secondary to DKA  - HCO3 12, AG 28, PH 7.3, B-HB 7.5, glucose >600, + ketones in urine, serum os 326  - marta stable  - started on insulin drip + IVF  - monitor vitals in CEU  - transition off insulin once AG closes  - c/w IVF 150ml/hr   - f/u a1c, lactate     # DEMIAN   - cr 1.3 during last admission, today 1.9   - BUN 2  - c/w IVF  - monitor cmps, avoid nephrotoxic medications     # Hyperkalemia   - K 6.6, non hemolysed   - started on insulin drip  - f/u serial CMPs    # DVT ppx- hep subq  # Activity- AAT  # Diet- carb consistent   # Code status - full  # Dispo- from home  32 yo M with PMH of DM presents to ED for hyperglycemia, admitted for treatment of DKA    # HAGMA likely secondary to DKA  - HCO3 12, AG 28, PH 7.3, B-HB 7.5, glucose >600, + ketones in urine, serum os 326  - marta stable  - started on insulin drip + IVF  - monitor vitals in CEU  - transition off insulin once AG closes  - c/w IVF 150ml/hr   - f/u a1c, lactate   - switch to d5 1/2 ns when glucose <200    # DEMIAN   - cr 1.3 during last admission, today 1.9   - BUN 2  - c/w IVF  - monitor cmps, avoid nephrotoxic medications     # Hyperkalemia   - K 6.6, hemolyzed   - started on insulin drip  - f/u serial CMPs    # DVT ppx- hep subq  # Activity- AAT  # Diet- carb consistent   # Code status - full  # Dispo- from home

## 2021-07-15 LAB
A1C WITH ESTIMATED AVERAGE GLUCOSE RESULT: 12.7 % — HIGH (ref 4–5.6)
ALBUMIN SERPL ELPH-MCNC: 4 G/DL — SIGNIFICANT CHANGE UP (ref 3.5–5.2)
ALBUMIN SERPL ELPH-MCNC: 4.1 G/DL — SIGNIFICANT CHANGE UP (ref 3.5–5.2)
ALBUMIN SERPL ELPH-MCNC: 4.3 G/DL — SIGNIFICANT CHANGE UP (ref 3.5–5.2)
ALBUMIN SERPL ELPH-MCNC: 4.4 G/DL — SIGNIFICANT CHANGE UP (ref 3.5–5.2)
ALP SERPL-CCNC: 72 U/L — SIGNIFICANT CHANGE UP (ref 30–115)
ALP SERPL-CCNC: 72 U/L — SIGNIFICANT CHANGE UP (ref 30–115)
ALP SERPL-CCNC: 79 U/L — SIGNIFICANT CHANGE UP (ref 30–115)
ALP SERPL-CCNC: 87 U/L — SIGNIFICANT CHANGE UP (ref 30–115)
ALT FLD-CCNC: 17 U/L — SIGNIFICANT CHANGE UP (ref 0–41)
ALT FLD-CCNC: 18 U/L — SIGNIFICANT CHANGE UP (ref 0–41)
ALT FLD-CCNC: 18 U/L — SIGNIFICANT CHANGE UP (ref 0–41)
ALT FLD-CCNC: 21 U/L — SIGNIFICANT CHANGE UP (ref 0–41)
ANION GAP SERPL CALC-SCNC: 12 MMOL/L — SIGNIFICANT CHANGE UP (ref 7–14)
ANION GAP SERPL CALC-SCNC: 14 MMOL/L — SIGNIFICANT CHANGE UP (ref 7–14)
ANION GAP SERPL CALC-SCNC: 17 MMOL/L — HIGH (ref 7–14)
ANION GAP SERPL CALC-SCNC: 21 MMOL/L — HIGH (ref 7–14)
AST SERPL-CCNC: 10 U/L — SIGNIFICANT CHANGE UP (ref 0–41)
AST SERPL-CCNC: 10 U/L — SIGNIFICANT CHANGE UP (ref 0–41)
AST SERPL-CCNC: 16 U/L — SIGNIFICANT CHANGE UP (ref 0–41)
AST SERPL-CCNC: 18 U/L — SIGNIFICANT CHANGE UP (ref 0–41)
BASOPHILS # BLD AUTO: 0.05 K/UL — SIGNIFICANT CHANGE UP (ref 0–0.2)
BASOPHILS NFR BLD AUTO: 0.8 % — SIGNIFICANT CHANGE UP (ref 0–1)
BILIRUB SERPL-MCNC: 0.4 MG/DL — SIGNIFICANT CHANGE UP (ref 0.2–1.2)
BILIRUB SERPL-MCNC: 0.5 MG/DL — SIGNIFICANT CHANGE UP (ref 0.2–1.2)
BILIRUB SERPL-MCNC: 0.5 MG/DL — SIGNIFICANT CHANGE UP (ref 0.2–1.2)
BILIRUB SERPL-MCNC: 0.6 MG/DL — SIGNIFICANT CHANGE UP (ref 0.2–1.2)
BUN SERPL-MCNC: 12 MG/DL — SIGNIFICANT CHANGE UP (ref 10–20)
BUN SERPL-MCNC: 13 MG/DL — SIGNIFICANT CHANGE UP (ref 10–20)
BUN SERPL-MCNC: 16 MG/DL — SIGNIFICANT CHANGE UP (ref 10–20)
BUN SERPL-MCNC: 18 MG/DL — SIGNIFICANT CHANGE UP (ref 10–20)
CALCIUM SERPL-MCNC: 9.1 MG/DL — SIGNIFICANT CHANGE UP (ref 8.5–10.1)
CALCIUM SERPL-MCNC: 9.2 MG/DL — SIGNIFICANT CHANGE UP (ref 8.5–10.1)
CALCIUM SERPL-MCNC: 9.6 MG/DL — SIGNIFICANT CHANGE UP (ref 8.5–10.1)
CALCIUM SERPL-MCNC: 9.6 MG/DL — SIGNIFICANT CHANGE UP (ref 8.5–10.1)
CHLORIDE SERPL-SCNC: 101 MMOL/L — SIGNIFICANT CHANGE UP (ref 98–110)
CHLORIDE SERPL-SCNC: 102 MMOL/L — SIGNIFICANT CHANGE UP (ref 98–110)
CHLORIDE SERPL-SCNC: 103 MMOL/L — SIGNIFICANT CHANGE UP (ref 98–110)
CHLORIDE SERPL-SCNC: 98 MMOL/L — SIGNIFICANT CHANGE UP (ref 98–110)
CK MB CFR SERPL CALC: 1.1 NG/ML — SIGNIFICANT CHANGE UP (ref 0.6–6.3)
CK SERPL-CCNC: 91 U/L — SIGNIFICANT CHANGE UP (ref 0–225)
CO2 SERPL-SCNC: 17 MMOL/L — SIGNIFICANT CHANGE UP (ref 17–32)
CO2 SERPL-SCNC: 21 MMOL/L — SIGNIFICANT CHANGE UP (ref 17–32)
CO2 SERPL-SCNC: 22 MMOL/L — SIGNIFICANT CHANGE UP (ref 17–32)
CO2 SERPL-SCNC: 23 MMOL/L — SIGNIFICANT CHANGE UP (ref 17–32)
COVID-19 SPIKE DOMAIN AB INTERP: POSITIVE
COVID-19 SPIKE DOMAIN ANTIBODY RESULT: >250 U/ML — HIGH
CREAT SERPL-MCNC: 1.3 MG/DL — SIGNIFICANT CHANGE UP (ref 0.7–1.5)
CREAT SERPL-MCNC: 1.4 MG/DL — SIGNIFICANT CHANGE UP (ref 0.7–1.5)
CREAT SERPL-MCNC: 1.5 MG/DL — SIGNIFICANT CHANGE UP (ref 0.7–1.5)
CREAT SERPL-MCNC: 1.5 MG/DL — SIGNIFICANT CHANGE UP (ref 0.7–1.5)
EOSINOPHIL # BLD AUTO: 0.17 K/UL — SIGNIFICANT CHANGE UP (ref 0–0.7)
EOSINOPHIL NFR BLD AUTO: 2.7 % — SIGNIFICANT CHANGE UP (ref 0–8)
ESTIMATED AVERAGE GLUCOSE: 318 MG/DL — HIGH (ref 68–114)
GLUCOSE BLDC GLUCOMTR-MCNC: 119 MG/DL — HIGH (ref 70–99)
GLUCOSE BLDC GLUCOMTR-MCNC: 151 MG/DL — HIGH (ref 70–99)
GLUCOSE BLDC GLUCOMTR-MCNC: 159 MG/DL — HIGH (ref 70–99)
GLUCOSE BLDC GLUCOMTR-MCNC: 163 MG/DL — HIGH (ref 70–99)
GLUCOSE BLDC GLUCOMTR-MCNC: 176 MG/DL — HIGH (ref 70–99)
GLUCOSE BLDC GLUCOMTR-MCNC: 180 MG/DL — HIGH (ref 70–99)
GLUCOSE BLDC GLUCOMTR-MCNC: 182 MG/DL — HIGH (ref 70–99)
GLUCOSE BLDC GLUCOMTR-MCNC: 187 MG/DL — HIGH (ref 70–99)
GLUCOSE BLDC GLUCOMTR-MCNC: 188 MG/DL — HIGH (ref 70–99)
GLUCOSE BLDC GLUCOMTR-MCNC: 210 MG/DL — HIGH (ref 70–99)
GLUCOSE BLDC GLUCOMTR-MCNC: 212 MG/DL — HIGH (ref 70–99)
GLUCOSE BLDC GLUCOMTR-MCNC: 215 MG/DL — HIGH (ref 70–99)
GLUCOSE BLDC GLUCOMTR-MCNC: 241 MG/DL — HIGH (ref 70–99)
GLUCOSE BLDC GLUCOMTR-MCNC: 247 MG/DL — HIGH (ref 70–99)
GLUCOSE SERPL-MCNC: 143 MG/DL — HIGH (ref 70–99)
GLUCOSE SERPL-MCNC: 165 MG/DL — HIGH (ref 70–99)
GLUCOSE SERPL-MCNC: 211 MG/DL — HIGH (ref 70–99)
GLUCOSE SERPL-MCNC: 223 MG/DL — HIGH (ref 70–99)
HCT VFR BLD CALC: 39.7 % — LOW (ref 42–52)
HGB BLD-MCNC: 13.3 G/DL — LOW (ref 14–18)
IMM GRANULOCYTES NFR BLD AUTO: 0.5 % — HIGH (ref 0.1–0.3)
LACTATE SERPL-SCNC: 1 MMOL/L — SIGNIFICANT CHANGE UP (ref 0.7–2)
LACTATE SERPL-SCNC: 1.2 MMOL/L — SIGNIFICANT CHANGE UP (ref 0.7–2)
LYMPHOCYTES # BLD AUTO: 2.23 K/UL — SIGNIFICANT CHANGE UP (ref 1.2–3.4)
LYMPHOCYTES # BLD AUTO: 34.8 % — SIGNIFICANT CHANGE UP (ref 20.5–51.1)
MCHC RBC-ENTMCNC: 27.2 PG — SIGNIFICANT CHANGE UP (ref 27–31)
MCHC RBC-ENTMCNC: 33.5 G/DL — SIGNIFICANT CHANGE UP (ref 32–37)
MCV RBC AUTO: 81.2 FL — SIGNIFICANT CHANGE UP (ref 80–94)
MONOCYTES # BLD AUTO: 0.73 K/UL — HIGH (ref 0.1–0.6)
MONOCYTES NFR BLD AUTO: 11.4 % — HIGH (ref 1.7–9.3)
NEUTROPHILS # BLD AUTO: 3.19 K/UL — SIGNIFICANT CHANGE UP (ref 1.4–6.5)
NEUTROPHILS NFR BLD AUTO: 49.8 % — SIGNIFICANT CHANGE UP (ref 42.2–75.2)
NRBC # BLD: 0 /100 WBCS — SIGNIFICANT CHANGE UP (ref 0–0)
PLATELET # BLD AUTO: 264 K/UL — SIGNIFICANT CHANGE UP (ref 130–400)
POTASSIUM SERPL-MCNC: 3.7 MMOL/L — SIGNIFICANT CHANGE UP (ref 3.5–5)
POTASSIUM SERPL-MCNC: 3.9 MMOL/L — SIGNIFICANT CHANGE UP (ref 3.5–5)
POTASSIUM SERPL-MCNC: 4 MMOL/L — SIGNIFICANT CHANGE UP (ref 3.5–5)
POTASSIUM SERPL-MCNC: 4.1 MMOL/L — SIGNIFICANT CHANGE UP (ref 3.5–5)
POTASSIUM SERPL-SCNC: 3.7 MMOL/L — SIGNIFICANT CHANGE UP (ref 3.5–5)
POTASSIUM SERPL-SCNC: 3.9 MMOL/L — SIGNIFICANT CHANGE UP (ref 3.5–5)
POTASSIUM SERPL-SCNC: 4 MMOL/L — SIGNIFICANT CHANGE UP (ref 3.5–5)
POTASSIUM SERPL-SCNC: 4.1 MMOL/L — SIGNIFICANT CHANGE UP (ref 3.5–5)
PROT SERPL-MCNC: 6.5 G/DL — SIGNIFICANT CHANGE UP (ref 6–8)
PROT SERPL-MCNC: 6.6 G/DL — SIGNIFICANT CHANGE UP (ref 6–8)
PROT SERPL-MCNC: 6.7 G/DL — SIGNIFICANT CHANGE UP (ref 6–8)
PROT SERPL-MCNC: 7.2 G/DL — SIGNIFICANT CHANGE UP (ref 6–8)
RBC # BLD: 4.89 M/UL — SIGNIFICANT CHANGE UP (ref 4.7–6.1)
RBC # FLD: 12.3 % — SIGNIFICANT CHANGE UP (ref 11.5–14.5)
SARS-COV-2 IGG+IGM SERPL QL IA: >250 U/ML — HIGH
SARS-COV-2 IGG+IGM SERPL QL IA: POSITIVE
SODIUM SERPL-SCNC: 136 MMOL/L — SIGNIFICANT CHANGE UP (ref 135–146)
SODIUM SERPL-SCNC: 137 MMOL/L — SIGNIFICANT CHANGE UP (ref 135–146)
SODIUM SERPL-SCNC: 138 MMOL/L — SIGNIFICANT CHANGE UP (ref 135–146)
SODIUM SERPL-SCNC: 140 MMOL/L — SIGNIFICANT CHANGE UP (ref 135–146)
TROPONIN T SERPL-MCNC: <0.01 NG/ML — SIGNIFICANT CHANGE UP
WBC # BLD: 6.4 K/UL — SIGNIFICANT CHANGE UP (ref 4.8–10.8)
WBC # FLD AUTO: 6.4 K/UL — SIGNIFICANT CHANGE UP (ref 4.8–10.8)

## 2021-07-15 PROCEDURE — 99222 1ST HOSP IP/OBS MODERATE 55: CPT

## 2021-07-15 RX ORDER — SODIUM CHLORIDE 9 MG/ML
1000 INJECTION, SOLUTION INTRAVENOUS
Refills: 0 | Status: DISCONTINUED | OUTPATIENT
Start: 2021-07-15 | End: 2021-07-17

## 2021-07-15 RX ORDER — INSULIN LISPRO 100/ML
2 VIAL (ML) SUBCUTANEOUS ONCE
Refills: 0 | Status: COMPLETED | OUTPATIENT
Start: 2021-07-15 | End: 2021-07-15

## 2021-07-15 RX ORDER — PANTOPRAZOLE SODIUM 20 MG/1
40 TABLET, DELAYED RELEASE ORAL
Refills: 0 | Status: DISCONTINUED | OUTPATIENT
Start: 2021-07-15 | End: 2021-07-17

## 2021-07-15 RX ORDER — DEXTROSE 50 % IN WATER 50 %
12.5 SYRINGE (ML) INTRAVENOUS ONCE
Refills: 0 | Status: DISCONTINUED | OUTPATIENT
Start: 2021-07-15 | End: 2021-07-17

## 2021-07-15 RX ORDER — DEXTROSE 50 % IN WATER 50 %
25 SYRINGE (ML) INTRAVENOUS ONCE
Refills: 0 | Status: DISCONTINUED | OUTPATIENT
Start: 2021-07-15 | End: 2021-07-17

## 2021-07-15 RX ORDER — DEXTROSE 50 % IN WATER 50 %
15 SYRINGE (ML) INTRAVENOUS ONCE
Refills: 0 | Status: DISCONTINUED | OUTPATIENT
Start: 2021-07-15 | End: 2021-07-17

## 2021-07-15 RX ORDER — INSULIN GLARGINE 100 [IU]/ML
30 INJECTION, SOLUTION SUBCUTANEOUS ONCE
Refills: 0 | Status: COMPLETED | OUTPATIENT
Start: 2021-07-15 | End: 2021-07-15

## 2021-07-15 RX ORDER — GLUCAGON INJECTION, SOLUTION 0.5 MG/.1ML
1 INJECTION, SOLUTION SUBCUTANEOUS ONCE
Refills: 0 | Status: DISCONTINUED | OUTPATIENT
Start: 2021-07-15 | End: 2021-07-17

## 2021-07-15 RX ORDER — INSULIN GLARGINE 100 [IU]/ML
30 INJECTION, SOLUTION SUBCUTANEOUS EVERY MORNING
Refills: 0 | Status: DISCONTINUED | OUTPATIENT
Start: 2021-07-16 | End: 2021-07-17

## 2021-07-15 RX ORDER — SODIUM CHLORIDE 9 MG/ML
1000 INJECTION, SOLUTION INTRAVENOUS
Refills: 0 | Status: DISCONTINUED | OUTPATIENT
Start: 2021-07-15 | End: 2021-07-15

## 2021-07-15 RX ORDER — INSULIN LISPRO 100/ML
VIAL (ML) SUBCUTANEOUS
Refills: 0 | Status: DISCONTINUED | OUTPATIENT
Start: 2021-07-15 | End: 2021-07-17

## 2021-07-15 RX ORDER — INSULIN LISPRO 100/ML
10 VIAL (ML) SUBCUTANEOUS
Refills: 0 | Status: DISCONTINUED | OUTPATIENT
Start: 2021-07-15 | End: 2021-07-17

## 2021-07-15 RX ORDER — SODIUM CHLORIDE 9 MG/ML
1000 INJECTION, SOLUTION INTRAVENOUS
Refills: 0 | Status: DISCONTINUED | OUTPATIENT
Start: 2021-07-15 | End: 2021-07-16

## 2021-07-15 RX ORDER — INSULIN LISPRO 100/ML
8 VIAL (ML) SUBCUTANEOUS ONCE
Refills: 0 | Status: COMPLETED | OUTPATIENT
Start: 2021-07-15 | End: 2021-07-15

## 2021-07-15 RX ADMIN — Medication 1: at 17:48

## 2021-07-15 RX ADMIN — SODIUM CHLORIDE 150 MILLILITER(S): 9 INJECTION, SOLUTION INTRAVENOUS at 11:08

## 2021-07-15 RX ADMIN — INSULIN HUMAN 7 UNIT(S)/HR: 100 INJECTION, SOLUTION SUBCUTANEOUS at 05:27

## 2021-07-15 RX ADMIN — HEPARIN SODIUM 5000 UNIT(S): 5000 INJECTION INTRAVENOUS; SUBCUTANEOUS at 17:51

## 2021-07-15 RX ADMIN — HEPARIN SODIUM 5000 UNIT(S): 5000 INJECTION INTRAVENOUS; SUBCUTANEOUS at 05:28

## 2021-07-15 RX ADMIN — Medication 10 UNIT(S): at 17:48

## 2021-07-15 RX ADMIN — CHLORHEXIDINE GLUCONATE 1 APPLICATION(S): 213 SOLUTION TOPICAL at 05:28

## 2021-07-15 RX ADMIN — INSULIN GLARGINE 30 UNIT(S): 100 INJECTION, SOLUTION SUBCUTANEOUS at 14:12

## 2021-07-15 RX ADMIN — Medication 2 UNIT(S): at 21:27

## 2021-07-15 RX ADMIN — Medication 8 UNIT(S): at 23:54

## 2021-07-15 RX ADMIN — SODIUM CHLORIDE 75 MILLILITER(S): 9 INJECTION, SOLUTION INTRAVENOUS at 14:12

## 2021-07-15 NOTE — PROGRESS NOTE ADULT - SUBJECTIVE AND OBJECTIVE BOX
EDWARD AGUIAR 31y Male  MRN#: [id number]   CODE STATUS: full code   Hospital Day: 1d    Pt is currently admitted with the primary diagnosis of DKA    SUBJECTIVE    No events overnight, feeling better this morning, no nausea/vomiting, abdominal pain                                             ----------------------------------------------------------  OBJECTIVE  PAST MEDICAL & SURGICAL HISTORY  DM (diabetes mellitus)                                              -----------------------------------------------------------  ALLERGIES:  No Known Allergies                                            ------------------------------------------------------------    HOME MEDICATIONS  Home Medications:                           MEDICATIONS:    ----------------------------------------------  DRUGS ORDERED     ----------------------------------------------  DRUGS ADMINISTERED      -----------------------------------------------  STANDING MEDICATIONS  chlorhexidine 4% Liquid 1 Application(s) Topical <User Schedule>  dextrose 5% + sodium chloride 0.45%. 1000 milliLiter(s) IV Continuous <Continuous>  heparin   Injectable 5000 Unit(s) SubCutaneous every 12 hours  insulin regular Infusion 7 Unit(s)/Hr IV Continuous <Continuous>  pantoprazole    Tablet 40 milliGRAM(s) Oral before breakfast    PRN MEDICATIONS  aluminum hydroxide/magnesium hydroxide/simethicone Suspension 30 milliLiter(s) Oral every 4 hours PRN  ondansetron Injectable 4 milliGRAM(s) IV Push every 8 hours PRN                                            ------------------------------------------------------------  VITAL SIGNS: Last 24 Hours  T(C): 36.8 (15 Jul 2021 08:00), Max: 37.7 (2021 21:35)  T(F): 98.2 (15 Jul 2021 08:00), Max: 99.8 (2021 21:35)  HR: 84 (15 Jul 2021 12:00) (74 - 114)  BP: 118/80 (15 Jul 2021 12:00) (97/54 - 132/76)  BP(mean): 96 (15 Jul 2021 12:00) (68 - 96)  RR: 18 (15 Jul 2021 12:00) (15 - 35)  SpO2: 99% (15 Jul 2021 12:00) (95% - 99%)      21 @ 07:  -  07-15-21 @ 07:00  --------------------------------------------------------  IN: 1608 mL / OUT: 600 mL / NET: 1008 mL    07-15-21 @ 07:01  -  07-15-21 @ 12:33  --------------------------------------------------------  IN: 328 mL / OUT: 0 mL / NET: 328 mL                                             --------------------------------------------------------------  LABS:                        13.3   6.40  )-----------( 264      ( 15 Jul 2021 04:42 )             39.7     07-15    140  |  102  |  16  ----------------------------<  211<H>  3.7   |  21  |  1.5    Ca    9.6      15 Jul 2021 04:42  Mg     2.4         TPro  6.7  /  Alb  4.3  /  TBili  0.5  /  DBili  x   /  AST  10  /  ALT  17  /  AlkPhos  79  07-15      Urinalysis Basic - ( 2021 15:37 )    Color: Colorless / Appearance: Clear / S.026 / pH: x  Gluc: x / Ketone: Large  / Bili: Negative / Urobili: <2 mg/dL   Blood: x / Protein: Negative / Nitrite: Negative   Leuk Esterase: Negative / RBC: x / WBC x   Sq Epi: x / Non Sq Epi: x / Bacteria: x        Lactate, Blood: 1.0 mmol/L (07-15-21 @ 04:42)  Lactate, Blood: 1.2 mmol/L (07-15-21 @ 00:36)                                                      -------------------------------------------------------------  RADIOLOGY:          EXAM:  XR CHEST PA LAT 2V            PROCEDURE DATE:  2021            INTERPRETATION:  Clinical History / Reason for exam: Shortness of breath    Comparison : Chest radiograph 2021.    Technique/Positioning: PA and lateral views of the chest.    Findings:    Support devices: None.    Cardiac/mediastinum/hilum: Unremarkable.    Lung parenchyma/Pleura: No consolidation, effusion or pneumothorax.    Skeleton/soft tissues: Unremarkable.    Impression:    No radiographic evidence of acute cardiopulmonary disease.        --- End of Report ---                                        --------------------------------------------------------------    PHYSICAL EXAM:  General: in no distress,   HEENT: NCAT  LUNGS: CTAB, Good air entry bilat  HEART: RRR, +S1,S2, RRR  ABDOMEN: SNTTP, ND x 4 q's  EXT: Warm, well perfused x 4  NEURO: AxOx3, No FND's noted  SKIN: No new breakdown or rashes noted                                           --------------------------------------------------------------

## 2021-07-15 NOTE — CHART NOTE - NSCHARTNOTEFT_GEN_A_CORE
MICU Transfer Note    Transfer from: MICU  Transfer to:  ( x ) Medicine    (  ) Telemetry    (  ) RCU    (  ) Palliative    (  ) Stroke Unit    (  ) _______________  Accepting physican:     DAVID   32 yo M with PMH of DM presents to ED for hyperglycemia and weakness. Pt was in the hospital a couple of weeks ago for hyperglycemia and was dc home on metformin BID. However, he states it causes him constipation and he only takes it once a day. For the past few days he has had weakness and feels more fatigued. No polyuria or polydipsia. Pt had one episode of emesis yesterday and one today. No abdominal pain. No fevers or chills. In the Ed given bolus IVF, started on insulin drip and ns. potassium 6.6, bicarb 12, AG 28 BHB 7.5.     Admitted to MICU for treatment of DKA.    MICU COURSE:    -In the micu he was placed on insulin drip at 7 units per hour and D5 1/2NS at 150 ml/hour     -AG and blood glucose were monitored with serial CMP and POCT     -Anion Gap steadily improve and serum bicarbonate increased,     -Glucose was noted to be under 200, the D5 1/2NS was changed to 1/2 NS at a rate of 75ml/hour and PO intake was encouraged     -He was then transitioned to subq insulin at 30 glargine, and 10/10/10 of lispro, insulin drip was discontinued .     -patient was deemed medically stable to be transferred to medicine floor.         ASSESSMENT & PLAN:     32 yo M with PMH of DM presents to ED for hyperglycemia and weakness. Pt was in the hospital a couple of weeks ago for hyperglycemia and was dc home on metformin BID. However, he states it causes him constipation and he only takes it once a day. For the past few days he has had weakness and feels more fatigued. No polyuria or polydipsia. Pt had one episode of emesis yesterday and one today. No abdominal pain. No fevers or chills.     In the Ed given bolus IVF, started on insulin drip and ns.  labs on admission were significant for   potassium 6.6, bicarb 12, AG 28 BHB 7.5.   Admitted to ICU for treatment of DKA.     placed on insulin drip at 7 units per hour and D51/2NS at 150cc/h    transitioned to 1/2ns at 75ml/hr, off insulin drip, on subq insulin glargine 30units qd and  lispro 10 units TID with meals       # HAGMA likely secondary to DKA  - HCO3 12, AG 28, PH 7.3, B-HB 7.5, glucose >600, + ketones in urine, serum os 326  - marta stable, anion gap improved,   - insulin drip + IVF  - monitor vitals   - CMP at 11am   -will transition off insulin drip to subq if anion gap is closed,   --glargine 30, lispro 10/10/10  -will transition from d5 1/2 ns to 1/2 ns at 75ml   - lactate trended down   -f/u a1c  -endo consult   -f/u trop at 11am     # DEMIAN   - cr 1.3 during last admission, today 1.9 now at 1.5 resolving  - c/w IVF  - monitor cmps,   -avoid nephrotoxic medications   -renally dose meds     # Hyperkalemia   - K 6.6, hemolyzed, now 3.7 continue to monitor replete as needed   - f/u 11am CMP    # DVT ppx- hep subq 5000 q12  # Activity- AAT  # Diet- carb consistent   # Code status - full  # Dispo- step down to floors with continued improvement                   For Follow-Up:  -Endocrine consult   -troponin   -resolving demian     Vital Signs Last 24 Hrs  T(C): 36.9 (15 Jul 2021 12:00), Max: 37.7 (14 Jul 2021 21:35)  T(F): 98.4 (15 Jul 2021 12:00), Max: 99.8 (14 Jul 2021 21:35)  HR: 80 (15 Jul 2021 16:00) (72 - 114)  BP: 126/69 (15 Jul 2021 16:00) (97/54 - 132/76)  BP(mean): 88 (15 Jul 2021 16:00) (68 - 96)  RR: 20 (15 Jul 2021 16:00) (15 - 35)  SpO2: 98% (15 Jul 2021 16:00) (95% - 100%)  I&O's Summary    14 Jul 2021 07:01  -  15 Jul 2021 07:00  --------------------------------------------------------  IN: 1608 mL / OUT: 600 mL / NET: 1008 mL    15 Jul 2021 07:01  -  15 Jul 2021 16:07  --------------------------------------------------------  IN: 799 mL / OUT: 300 mL / NET: 499 mL          MEDICATIONS  (STANDING):  chlorhexidine 4% Liquid 1 Application(s) Topical <User Schedule>  dextrose 40% Gel 15 Gram(s) Oral once  dextrose 5%. 1000 milliLiter(s) (100 mL/Hr) IV Continuous <Continuous>  dextrose 5%. 1000 milliLiter(s) (50 mL/Hr) IV Continuous <Continuous>  dextrose 50% Injectable 25 Gram(s) IV Push once  dextrose 50% Injectable 12.5 Gram(s) IV Push once  dextrose 50% Injectable 25 Gram(s) IV Push once  glucagon  Injectable 1 milliGRAM(s) IntraMuscular once  heparin   Injectable 5000 Unit(s) SubCutaneous every 12 hours  insulin lispro Injectable (ADMELOG) 10 Unit(s) SubCutaneous three times a day before meals  pantoprazole    Tablet 40 milliGRAM(s) Oral before breakfast  sodium chloride 0.45%. 1000 milliLiter(s) (75 mL/Hr) IV Continuous <Continuous>    MEDICATIONS  (PRN):  aluminum hydroxide/magnesium hydroxide/simethicone Suspension 30 milliLiter(s) Oral every 4 hours PRN Dyspepsia  ondansetron Injectable 4 milliGRAM(s) IV Push every 8 hours PRN Nausea and/or Vomiting        LABS                                            13.3                  Neurophils% (auto):   49.8   (07-15 @ 04:42):    6.40 )-----------(264          Lymphocytes% (auto):  34.8                                          39.7                   Eosinphils% (auto):   2.7      Manual%: Neutrophils x    ; Lymphocytes x    ; Eosinophils x    ; Bands%: x    ; Blasts x                                    137    |  103    |  13                  Calcium: 9.2   / iCa: x      (07-15 @ 11:28)    ----------------------------<  165       Magnesium: x                                3.9     |  22     |  1.3              Phosphorous: x        TPro  6.5    /  Alb  4.0    /  TBili  0.5    /  DBili  x      /  AST  16     /  ALT  18     /  AlkPhos  72     15 Jul 2021 11:28 MICU Transfer Note    Transfer from: MICU  Transfer to:  ( x ) Medicine    (  ) Telemetry    (  ) RCU    (  ) Palliative    (  ) Stroke Unit    (  ) _______________  Accepting physican:     DAVID   30 yo M with PMH of DM presents to ED for hyperglycemia and weakness. Pt was in the hospital a couple of weeks ago for hyperglycemia and was dc home on metformin BID. However, he states it causes him constipation and he only takes it once a day. For the past few days he has had weakness and feels more fatigued. No polyuria or polydipsia. Pt had one episode of emesis yesterday and one today. No abdominal pain. No fevers or chills. In the Ed given bolus IVF, started on insulin drip and ns. potassium 6.6, bicarb 12, AG 28 BHB 7.5.     Admitted to MICU for treatment of DKA.    MICU COURSE:    -In the micu he was placed on insulin drip at 7 units per hour and D5 1/2NS at 150 ml/hour     -Anion gap and blood glucose were monitored with serial CMP and POCT     -Anion Gap steadily improve and serum bicarbonate increased,     -Glucose was noted to be under 200, the D5 1/2NS was changed to 1/2 NS at a rate of 75ml/hour and PO intake was encouraged     -He was then transitioned to subq insulin at 30 glargine, and 10/10/10 of lispro, insulin drip was discontinued .     -patient was deemed medically stable to be transferred to medicine floor.         ASSESSMENT & PLAN:     30 yo M with PMH of DM presents to ED for hyperglycemia and weakness. Pt was in the hospital a couple of weeks ago for hyperglycemia and was dc home on metformin BID. However, he states it causes him constipation and he only takes it once a day. For the past few days he has had weakness and feels more fatigued. No polyuria or polydipsia. Pt had one episode of emesis yesterday and one today. No abdominal pain. No fevers or chills.     In the Ed given bolus IVF, started on insulin drip and ns.  labs on admission were significant for   potassium 6.6, bicarb 12, AG 28 BHB 7.5.   Admitted to ICU for treatment of DKA.     placed on insulin drip at 7 units per hour and D51/2NS at 150cc/h    transitioned to 1/2ns at 75ml/hr, off insulin drip, on subq insulin glargine 30units qd and  lispro 10 units TID with meals       # HAGMA likely secondary to DKA  - HCO3 12, AG 28, PH 7.3, B-HB 7.5, glucose >600, + ketones in urine, serum os 326  - marta stable, anion gap improved,   - insulin drip + IVF  - monitor vitals   - CMP at 11am   -will transition off insulin drip to subq if anion gap is closed,   --glargine 30, lispro 10/10/10  -will transition from d5 1/2 ns to 1/2 ns at 75ml   - lactate trended down   -f/u a1c  -endo consult   -f/u trop at 11am     # DEMIAN   - cr 1.3 during last admission, today 1.9 now at 1.5 resolving  - c/w IVF  - monitor cmps,   -avoid nephrotoxic medications   -renally dose meds     # Hyperkalemia   - K 6.6, hemolyzed, now 3.7 continue to monitor replete as needed   - f/u 11am CMP    # DVT ppx- hep subq 5000 q12  # Activity- AAT  # Diet- carb consistent   # Code status - full  # Dispo- step down to floors with continued improvement                   For Follow-Up:  -Endocrine consult - f/u recommendations regarding insulin regimen   -troponin   -resolving demian     Vital Signs Last 24 Hrs  T(C): 36.9 (15 Jul 2021 12:00), Max: 37.7 (14 Jul 2021 21:35)  T(F): 98.4 (15 Jul 2021 12:00), Max: 99.8 (14 Jul 2021 21:35)  HR: 80 (15 Jul 2021 16:00) (72 - 114)  BP: 126/69 (15 Jul 2021 16:00) (97/54 - 132/76)  BP(mean): 88 (15 Jul 2021 16:00) (68 - 96)  RR: 20 (15 Jul 2021 16:00) (15 - 35)  SpO2: 98% (15 Jul 2021 16:00) (95% - 100%)  I&O's Summary    14 Jul 2021 07:01  -  15 Jul 2021 07:00  --------------------------------------------------------  IN: 1608 mL / OUT: 600 mL / NET: 1008 mL    15 Jul 2021 07:01  -  15 Jul 2021 16:07  --------------------------------------------------------  IN: 799 mL / OUT: 300 mL / NET: 499 mL          MEDICATIONS  (STANDING):  chlorhexidine 4% Liquid 1 Application(s) Topical <User Schedule>  dextrose 40% Gel 15 Gram(s) Oral once  dextrose 5%. 1000 milliLiter(s) (100 mL/Hr) IV Continuous <Continuous>  dextrose 5%. 1000 milliLiter(s) (50 mL/Hr) IV Continuous <Continuous>  dextrose 50% Injectable 25 Gram(s) IV Push once  dextrose 50% Injectable 12.5 Gram(s) IV Push once  dextrose 50% Injectable 25 Gram(s) IV Push once  glucagon  Injectable 1 milliGRAM(s) IntraMuscular once  heparin   Injectable 5000 Unit(s) SubCutaneous every 12 hours  insulin lispro Injectable (ADMELOG) 10 Unit(s) SubCutaneous three times a day before meals  pantoprazole    Tablet 40 milliGRAM(s) Oral before breakfast  sodium chloride 0.45%. 1000 milliLiter(s) (75 mL/Hr) IV Continuous <Continuous>    MEDICATIONS  (PRN):  aluminum hydroxide/magnesium hydroxide/simethicone Suspension 30 milliLiter(s) Oral every 4 hours PRN Dyspepsia  ondansetron Injectable 4 milliGRAM(s) IV Push every 8 hours PRN Nausea and/or Vomiting        LABS                                            13.3                  Neurophils% (auto):   49.8   (07-15 @ 04:42):    6.40 )-----------(264          Lymphocytes% (auto):  34.8                                          39.7                   Eosinphils% (auto):   2.7      Manual%: Neutrophils x    ; Lymphocytes x    ; Eosinophils x    ; Bands%: x    ; Blasts x                                    137    |  103    |  13                  Calcium: 9.2   / iCa: x      (07-15 @ 11:28)    ----------------------------<  165       Magnesium: x                                3.9     |  22     |  1.3              Phosphorous: x        TPro  6.5    /  Alb  4.0    /  TBili  0.5    /  DBili  x      /  AST  16     /  ALT  18     /  AlkPhos  72     15 Jul 2021 11:28 MICU Transfer Note    Transfer from: MICU  Transfer to:  ( x ) Medicine    (  ) Telemetry    (  ) RCU    (  ) Palliative    (  ) Stroke Unit    (  ) _______________  Accepting physican:     DAVID   32 yo M with PMH of DM presents to ED for hyperglycemia and weakness. Pt was in the hospital a couple of weeks ago for hyperglycemia and was dc home on metformin BID. However, he states it causes him constipation and he only takes it once a day. For the past few days he has had weakness and feels more fatigued. No polyuria or polydipsia. Pt had one episode of emesis yesterday and one today. No abdominal pain. No fevers or chills. In the Ed given bolus IVF, started on insulin drip and ns. potassium 6.6, bicarb 12, AG 28 BHB 7.5.     Admitted to MICU for treatment of DKA.    MICU COURSE:    -In the micu he was placed on insulin drip at 7 units per hour and D5 1/2NS at 150 ml/hour     -Anion gap and blood glucose were monitored with serial CMP and POCT     -Anion Gap steadily improve and serum bicarbonate increased,     -Glucose was noted to be under 200, the D5 1/2NS was changed to 1/2 NS at a rate of 75ml/hour and PO intake was encouraged     -He was then transitioned to subq insulin at 30 glargine, and 10/10/10 of lispro, insulin drip was discontinued .     -pioglitazone 30mg daily PO was added      -patient was deemed medically stable to be transferred to medicine floor.         ASSESSMENT & PLAN:     32 yo M with PMH of DM presents to ED for hyperglycemia and weakness, non compliant with home diabetes medications, found to be in DKA with potassium 6.6, bicarb 12, AG 28 BHB 7.5.     Admitted to ICU for treatment of DKA.     s/p insulin drip started at 7 units per hour and D5 1/2NS at 150cc/h    now transitioned to subq insulin regimen 30/10/10/10, and off IV fluids     CMP this am anion gap of 14, bicarb of 21, and glucose of 178.       # HAGMA secondary to DKA- resolved    #uncontrolled Diabetes type 2   a1c 12.7   - marta stable, anion gap improved,   - BP soft s/p IVF LR 500ml bolus   - monitor vitals   - f/u CMP  - glargine 30, lispro 10/10/10  -pioglitazone 30mg pO daily   - f/u endo consult regarding discharge medications       # DEMIAN -  in setting of DKA dehydration, resolvin cr 1.3 today, appears to be his baseline   - monitor cmp   -avoid nephrotoxic medications   -renally dose meds     # Hyperkalemia - resolved    - 3.7 today, continue to monitor replete as needed   - f/u CMP    #obesity   -education provided   -outpatient f/u     # DVT ppx- hep subq 5000 q12  # Activity- AAT  # Diet- carb consistent   # Code status - full  # Dispo- step down to floors today                   For Follow-Up:  -Endocrine consult - f/u recommendations regarding discharge medications   -monitor BP  -monitor kidney function     Vital Signs Last 24 Hrs  T(C): 36.9 (15 Jul 2021 12:00), Max: 37.7 (14 Jul 2021 21:35)  T(F): 98.4 (15 Jul 2021 12:00), Max: 99.8 (14 Jul 2021 21:35)  HR: 80 (15 Jul 2021 16:00) (72 - 114)  BP: 126/69 (15 Jul 2021 16:00) (97/54 - 132/76)  BP(mean): 88 (15 Jul 2021 16:00) (68 - 96)  RR: 20 (15 Jul 2021 16:00) (15 - 35)  SpO2: 98% (15 Jul 2021 16:00) (95% - 100%)  I&O's Summary    14 Jul 2021 07:01  -  15 Jul 2021 07:00  --------------------------------------------------------  IN: 1608 mL / OUT: 600 mL / NET: 1008 mL    15 Jul 2021 07:01  -  15 Jul 2021 16:07  --------------------------------------------------------  IN: 799 mL / OUT: 300 mL / NET: 499 mL          MEDICATIONS  (STANDING):  chlorhexidine 4% Liquid 1 Application(s) Topical <User Schedule>  dextrose 40% Gel 15 Gram(s) Oral once  dextrose 5%. 1000 milliLiter(s) (100 mL/Hr) IV Continuous <Continuous>  dextrose 5%. 1000 milliLiter(s) (50 mL/Hr) IV Continuous <Continuous>  dextrose 50% Injectable 25 Gram(s) IV Push once  dextrose 50% Injectable 12.5 Gram(s) IV Push once  dextrose 50% Injectable 25 Gram(s) IV Push once  glucagon  Injectable 1 milliGRAM(s) IntraMuscular once  heparin   Injectable 5000 Unit(s) SubCutaneous every 12 hours  insulin lispro Injectable (ADMELOG) 10 Unit(s) SubCutaneous three times a day before meals  pantoprazole    Tablet 40 milliGRAM(s) Oral before breakfast  sodium chloride 0.45%. 1000 milliLiter(s) (75 mL/Hr) IV Continuous <Continuous>    MEDICATIONS  (PRN):  aluminum hydroxide/magnesium hydroxide/simethicone Suspension 30 milliLiter(s) Oral every 4 hours PRN Dyspepsia  ondansetron Injectable 4 milliGRAM(s) IV Push every 8 hours PRN Nausea and/or Vomiting        LABS                                            13.3                  Neurophils% (auto):   49.8   (07-15 @ 04:42):    6.40 )-----------(264          Lymphocytes% (auto):  34.8                                          39.7                   Eosinphils% (auto):   2.7      Manual%: Neutrophils x    ; Lymphocytes x    ; Eosinophils x    ; Bands%: x    ; Blasts x                                    137    |  103    |  13                  Calcium: 9.2   / iCa: x      (07-15 @ 11:28)    ----------------------------<  165       Magnesium: x                                3.9     |  22     |  1.3              Phosphorous: x        TPro  6.5    /  Alb  4.0    /  TBili  0.5    /  DBili  x      /  AST  16     /  ALT  18     /  AlkPhos  72     15 Jul 2021 11:28

## 2021-07-15 NOTE — CONSULT NOTE ADULT - ASSESSMENT
30 yo M with PMH of DM presents to ED for hyperglycemia and weakness.     # DKA - Improving   # Uncontrolled DM2   - c/w with insulin drip , switch to SC Lantus 40u and Lispro 12U  when AG closes   - will need home insulin upon disccharge  - Outpt f/u in clinic in 2 weeks    32 yo M with PMH of DM presents to ED for hyperglycemia and weakness.     # DKA - Improving   # Uncontrolled DM2   - c/w with insulin drip , switch to SC Lantus 40u and Lispro 12U  when AG closes   - will need home insulin upon disccharge  - Outpt f/u in clinic in 2 weeks . will plan for discharge meds, closer to discharge.

## 2021-07-15 NOTE — CONSULT NOTE ADULT - SUBJECTIVE AND OBJECTIVE BOX
Request for consultation:  Requested by:    Patient is a 31y old  Male who presents with a chief complaint of Hyperglycemia (15 Jul 2021 12:33)    HPI:  32 yo M with PMH of DM presents to ED for hyperglycemia and weakness. Pt was in the hospital a couple of weeks ago for hyperglycemia and was dc home on metformin BID. However, he states it causes him constipation and he only takes it once a day. For the past few days he has had weakness and feels more fatigued. No polyuria or polydipsia. Pt had one episode of emesis yesterday and one today. No abdominal pain. No fevers or chills. In the Ed given bolus IVF, started on insulin drip and ns. potassium 6.6, bicarb 12, AG 28 BHB 7.5. Admitted to [facility] for treatment of DKA. still on insulin drip, 150 CC D5 NS cc/h   (14 Jul 2021 18:56)      FAMILY HISTORY:   - DM: Mother and grandmother    PAST MEDICAL & SURGICAL HISTORY:  DM (diabetes mellitus)      Birth History:  Developmental History:    Review of Systems:  All review of systems negative, except for those marked:  General:		[] Abnormal:  Pulmonary:		[] Abnormal:  Cardiac:		[] Abnormal:  Gastrointestinal:	[] Abnormal:  ENT:			[] Abnormal:  Renal/Urologic:		[] Abnormal:  Musculoskeletal:	[] Abnormal:  Endocrine:		[] Abnormal:  Hematologic:		[] Abnormal:  Neurologic:		[] Abnormal:  Skin:			[] Abnormal:  Allergy/Immune:	[] Abnormal:  Psychiatric:		[] Abnormal:    Allergies    No Known Allergies    Intolerances      MEDICATIONS  (STANDING):  chlorhexidine 4% Liquid 1 Application(s) Topical <User Schedule>  dextrose 40% Gel 15 Gram(s) Oral once  dextrose 5%. 1000 milliLiter(s) (100 mL/Hr) IV Continuous <Continuous>  dextrose 5%. 1000 milliLiter(s) (50 mL/Hr) IV Continuous <Continuous>  dextrose 50% Injectable 25 Gram(s) IV Push once  dextrose 50% Injectable 12.5 Gram(s) IV Push once  dextrose 50% Injectable 25 Gram(s) IV Push once  glucagon  Injectable 1 milliGRAM(s) IntraMuscular once  heparin   Injectable 5000 Unit(s) SubCutaneous every 12 hours  insulin lispro Injectable (ADMELOG) 10 Unit(s) SubCutaneous three times a day before meals  insulin regular Infusion 7 Unit(s)/Hr (7 mL/Hr) IV Continuous <Continuous>  pantoprazole    Tablet 40 milliGRAM(s) Oral before breakfast  sodium chloride 0.45%. 1000 milliLiter(s) (75 mL/Hr) IV Continuous <Continuous>    MEDICATIONS  (PRN):  aluminum hydroxide/magnesium hydroxide/simethicone Suspension 30 milliLiter(s) Oral every 4 hours PRN Dyspepsia  ondansetron Injectable 4 milliGRAM(s) IV Push every 8 hours PRN Nausea and/or Vomiting      Vital Signs Last 24 Hrs  T(C): 36.8 (15 Jul 2021 08:00), Max: 37.7 (14 Jul 2021 21:35)  T(F): 98.2 (15 Jul 2021 08:00), Max: 99.8 (14 Jul 2021 21:35)  HR: 90 (15 Jul 2021 13:00) (74 - 114)  BP: 121/71 (15 Jul 2021 13:00) (97/54 - 132/76)  BP(mean): 87 (15 Jul 2021 13:00) (68 - 96)  RR: 20 (15 Jul 2021 13:00) (15 - 35)  SpO2: 99% (15 Jul 2021 13:00) (95% - 99%)  Height (cm): 180.3 (07-14 @ 14:11)  Weight (kg): 124.284 (07-14 @ 16:04)  BMI (kg/m2): 38.2 (07-14 @ 16:04)    PHYSICAL EXAM  All physical exam findings normal, except those marked:  General:	Alert, active, cooperative, NAD, well hydrated  .		[] Abnormal:  Neck		Normal: supple, no cervical adenopathy, no palpable thyroid  .		[] Abnormal:  Cardiovascular	Normal: regular rate, normal S1, S2, no murmurs  .		[] Abnormal:  Respiratory	Normal: no chest wall deformity, normal respiratory pattern, CTA B/L  .		[] Abnormal:  Abdominal	Normal: soft, ND, NT, bowel sounds present, no masses, no organomegaly  .		[] Abnormal:  		Normal normal genitalia, testes descended, circumcised/uncircumcised  .		Marcela stage:			Breast marcela:  .		Menstrual history:  .		[] Abnormal:  Extremities	Normal: FROM x4  .		[] Abnormal:  Skin		Normal: intact and not indurated, no rash,                           + acanthosis nigricans  Neurologic	Normal: grossly intact  .		[] Abnormal:    LABS  VBG - ( 14 Jul 2021 16:17 )  pH: 7.22  /  pCO2: 45    /  pO2: 25    / HCO3: 19    / Base Excess: -8.9  /  SvO2: 33    / Lactate: 2.5                            13.3   6.40  )-----------( 264      ( 15 Jul 2021 04:42 )             39.7     07-15    137  |  103  |  13  ----------------------------<  165<H>  3.9   |  22  |  1.3    Ca    9.2      15 Jul 2021 11:28  Mg     2.4     07-14    TPro  6.5  /  Alb  4.0  /  TBili  0.5  /  DBili  x   /  AST  16  /  ALT  18  /  AlkPhos  72  07-15      Ketone - Urine: Large (07-14 @ 15:37)    CAPILLARY BLOOD GLUCOSE      POCT Blood Glucose.: 163 mg/dL (15 Jul 2021 13:26)  POCT Blood Glucose.: 182 mg/dL (15 Jul 2021 12:33)  POCT Blood Glucose.: 159 mg/dL (15 Jul 2021 11:24)  POCT Blood Glucose.: 176 mg/dL (15 Jul 2021 10:00)  POCT Blood Glucose.: 180 mg/dL (15 Jul 2021 08:43)  POCT Blood Glucose.: 187 mg/dL (15 Jul 2021 07:49)  POCT Blood Glucose.: 188 mg/dL (15 Jul 2021 06:19)  POCT Blood Glucose.: 212 mg/dL (15 Jul 2021 04:57)  POCT Blood Glucose.: 212 mg/dL (15 Jul 2021 03:45)  POCT Blood Glucose.: 215 mg/dL (15 Jul 2021 02:07)  POCT Blood Glucose.: 210 mg/dL (15 Jul 2021 01:06)  POCT Blood Glucose.: 212 mg/dL (15 Jul 2021 00:05)  POCT Blood Glucose.: 232 mg/dL (14 Jul 2021 22:42)  POCT Blood Glucose.: 230 mg/dL (14 Jul 2021 21:46)  POCT Blood Glucose.: 221 mg/dL (14 Jul 2021 20:48)  POCT Blood Glucose.: 250 mg/dL (14 Jul 2021 19:44)  POCT Blood Glucose.: 276 mg/dL (14 Jul 2021 19:05)  POCT Blood Glucose.: 337 mg/dL (14 Jul 2021 18:14)  POCT Blood Glucose.: 403 mg/dL (14 Jul 2021 17:16)  POCT Blood Glucose.: 533 mg/dL (14 Jul 2021 16:12)

## 2021-07-15 NOTE — PROGRESS NOTE ADULT - ASSESSMENT
ASSESSMENT & PLAN    30 yo M with PMH of DM presents to ED for hyperglycemia and weakness. Pt was in the hospital a couple of weeks ago for hyperglycemia and was dc home on metformin BID. However, he states it causes him constipation and he only takes it once a day. For the past few days he has had weakness and feels more fatigued. No polyuria or polydipsia. Pt had one episode of emesis yesterday and one today. No abdominal pain. No fevers or chills.     In the Ed given bolus IVF, started on insulin drip and ns.  labs significant for   potassium 6.6, bicarb 12, AG 28 BHB 7.5.   Admitted to ICU for treatment of DKA.     currently on insulin drip at 7 units per hour and D51/2NS at 150cc/h    CMP this am anion gap of 17, bicarb of 21, and glucose of 211      # HAGMA likely secondary to DKA  - HCO3 12, AG 28, PH 7.3, B-HB 7.5, glucose >600, + ketones in urine, serum os 326  - marta stable, anion gap improved,   - insulin drip + IVF  - monitor vitals   - CMP at 11am   -will transition off insulin drip to subq if anion gap is closed,   --glargine 30, lispro 10/10/10  -will transition from d5 1/2 ns to 1/2 ns at 75ml   - lactate trended down   -f/u a1c  -endo consult     # DEMIAN   - cr 1.3 during last admission, today 1.9 now at 1.5 resolving  - c/w IVF  - monitor cmps,   -avoid nephrotoxic medications   -renally dose meds     # Hyperkalemia   - K 6.6, hemolyzed, now 3.7 continue to monitor replete as needed   - f/u 11am CMP    # DVT ppx- hep subq 5000 q12  # Activity- AAT  # Diet- carb consistent   # Code status - full  # Dispo- step down to floors with continued improvement                                  ASSESSMENT & PLAN    30 yo M with PMH of DM presents to ED for hyperglycemia and weakness. Pt was in the hospital a couple of weeks ago for hyperglycemia and was dc home on metformin BID. However, he states it causes him constipation and he only takes it once a day. For the past few days he has had weakness and feels more fatigued. No polyuria or polydipsia. Pt had one episode of emesis yesterday and one today. No abdominal pain. No fevers or chills.     In the Ed given bolus IVF, started on insulin drip and ns.  labs significant for   potassium 6.6, bicarb 12, AG 28 BHB 7.5.   Admitted to ICU for treatment of DKA.     currently on insulin drip at 7 units per hour and D51/2NS at 150cc/h    CMP this am anion gap of 17, bicarb of 21, and glucose of 211      # HAGMA likely secondary to DKA  - HCO3 12, AG 28, PH 7.3, B-HB 7.5, glucose >600, + ketones in urine, serum os 326  - marta stable, anion gap improved,   - insulin drip + IVF  - monitor vitals   - CMP at 11am   -will transition off insulin drip to subq if anion gap is closed,   --glargine 30, lispro 10/10/10  -will transition from d5 1/2 ns to 1/2 ns at 75ml   - lactate trended down   -f/u a1c  -endo consult   -f/u trop at 11am     # DEMIAN   - cr 1.3 during last admission, today 1.9 now at 1.5 resolving  - c/w IVF  - monitor cmps,   -avoid nephrotoxic medications   -renally dose meds     # Hyperkalemia   - K 6.6, hemolyzed, now 3.7 continue to monitor replete as needed   - f/u 11am CMP    # DVT ppx- hep subq 5000 q12  # Activity- AAT  # Diet- carb consistent   # Code status - full  # Dispo- step down to floors with continued improvement

## 2021-07-16 LAB
A1C WITH ESTIMATED AVERAGE GLUCOSE RESULT: 13.5 % — HIGH (ref 4–5.6)
ALBUMIN SERPL ELPH-MCNC: 3.7 G/DL — SIGNIFICANT CHANGE UP (ref 3.5–5.2)
ALBUMIN SERPL ELPH-MCNC: 3.7 G/DL — SIGNIFICANT CHANGE UP (ref 3.5–5.2)
ALP SERPL-CCNC: 68 U/L — SIGNIFICANT CHANGE UP (ref 30–115)
ALP SERPL-CCNC: 69 U/L — SIGNIFICANT CHANGE UP (ref 30–115)
ALT FLD-CCNC: 19 U/L — SIGNIFICANT CHANGE UP (ref 0–41)
ALT FLD-CCNC: 20 U/L — SIGNIFICANT CHANGE UP (ref 0–41)
ANION GAP SERPL CALC-SCNC: 14 MMOL/L — SIGNIFICANT CHANGE UP (ref 7–14)
ANION GAP SERPL CALC-SCNC: 15 MMOL/L — HIGH (ref 7–14)
AST SERPL-CCNC: 15 U/L — SIGNIFICANT CHANGE UP (ref 0–41)
AST SERPL-CCNC: 16 U/L — SIGNIFICANT CHANGE UP (ref 0–41)
BASOPHILS # BLD AUTO: 0.05 K/UL — SIGNIFICANT CHANGE UP (ref 0–0.2)
BASOPHILS NFR BLD AUTO: 1 % — SIGNIFICANT CHANGE UP (ref 0–1)
BILIRUB SERPL-MCNC: 0.4 MG/DL — SIGNIFICANT CHANGE UP (ref 0.2–1.2)
BILIRUB SERPL-MCNC: 0.6 MG/DL — SIGNIFICANT CHANGE UP (ref 0.2–1.2)
BUN SERPL-MCNC: 10 MG/DL — SIGNIFICANT CHANGE UP (ref 10–20)
BUN SERPL-MCNC: 11 MG/DL — SIGNIFICANT CHANGE UP (ref 10–20)
CALCIUM SERPL-MCNC: 8.9 MG/DL — SIGNIFICANT CHANGE UP (ref 8.5–10.1)
CALCIUM SERPL-MCNC: 9 MG/DL — SIGNIFICANT CHANGE UP (ref 8.5–10.1)
CHLORIDE SERPL-SCNC: 101 MMOL/L — SIGNIFICANT CHANGE UP (ref 98–110)
CHLORIDE SERPL-SCNC: 101 MMOL/L — SIGNIFICANT CHANGE UP (ref 98–110)
CO2 SERPL-SCNC: 19 MMOL/L — SIGNIFICANT CHANGE UP (ref 17–32)
CO2 SERPL-SCNC: 21 MMOL/L — SIGNIFICANT CHANGE UP (ref 17–32)
CREAT SERPL-MCNC: 1.3 MG/DL — SIGNIFICANT CHANGE UP (ref 0.7–1.5)
CREAT SERPL-MCNC: 1.3 MG/DL — SIGNIFICANT CHANGE UP (ref 0.7–1.5)
EOSINOPHIL # BLD AUTO: 0.17 K/UL — SIGNIFICANT CHANGE UP (ref 0–0.7)
EOSINOPHIL NFR BLD AUTO: 3.4 % — SIGNIFICANT CHANGE UP (ref 0–8)
ESTIMATED AVERAGE GLUCOSE: 341 MG/DL — HIGH (ref 68–114)
GLUCOSE BLDC GLUCOMTR-MCNC: 192 MG/DL — HIGH (ref 70–99)
GLUCOSE BLDC GLUCOMTR-MCNC: 195 MG/DL — HIGH (ref 70–99)
GLUCOSE BLDC GLUCOMTR-MCNC: 211 MG/DL — HIGH (ref 70–99)
GLUCOSE BLDC GLUCOMTR-MCNC: 227 MG/DL — HIGH (ref 70–99)
GLUCOSE BLDC GLUCOMTR-MCNC: 258 MG/DL — HIGH (ref 70–99)
GLUCOSE BLDC GLUCOMTR-MCNC: 265 MG/DL — HIGH (ref 70–99)
GLUCOSE BLDC GLUCOMTR-MCNC: 317 MG/DL — HIGH (ref 70–99)
GLUCOSE SERPL-MCNC: 173 MG/DL — HIGH (ref 70–99)
GLUCOSE SERPL-MCNC: 218 MG/DL — HIGH (ref 70–99)
HCT VFR BLD CALC: 37.9 % — LOW (ref 42–52)
HGB BLD-MCNC: 12.4 G/DL — LOW (ref 14–18)
IMM GRANULOCYTES NFR BLD AUTO: 0.2 % — SIGNIFICANT CHANGE UP (ref 0.1–0.3)
LYMPHOCYTES # BLD AUTO: 2.43 K/UL — SIGNIFICANT CHANGE UP (ref 1.2–3.4)
LYMPHOCYTES # BLD AUTO: 48.7 % — SIGNIFICANT CHANGE UP (ref 20.5–51.1)
MAGNESIUM SERPL-MCNC: 1.8 MG/DL — SIGNIFICANT CHANGE UP (ref 1.8–2.4)
MCHC RBC-ENTMCNC: 26.7 PG — LOW (ref 27–31)
MCHC RBC-ENTMCNC: 32.7 G/DL — SIGNIFICANT CHANGE UP (ref 32–37)
MCV RBC AUTO: 81.5 FL — SIGNIFICANT CHANGE UP (ref 80–94)
MONOCYTES # BLD AUTO: 0.56 K/UL — SIGNIFICANT CHANGE UP (ref 0.1–0.6)
MONOCYTES NFR BLD AUTO: 11.2 % — HIGH (ref 1.7–9.3)
NEUTROPHILS # BLD AUTO: 1.77 K/UL — SIGNIFICANT CHANGE UP (ref 1.4–6.5)
NEUTROPHILS NFR BLD AUTO: 35.5 % — LOW (ref 42.2–75.2)
NRBC # BLD: 0 /100 WBCS — SIGNIFICANT CHANGE UP (ref 0–0)
PLATELET # BLD AUTO: 227 K/UL — SIGNIFICANT CHANGE UP (ref 130–400)
POTASSIUM SERPL-MCNC: 3.7 MMOL/L — SIGNIFICANT CHANGE UP (ref 3.5–5)
POTASSIUM SERPL-MCNC: 3.9 MMOL/L — SIGNIFICANT CHANGE UP (ref 3.5–5)
POTASSIUM SERPL-SCNC: 3.7 MMOL/L — SIGNIFICANT CHANGE UP (ref 3.5–5)
POTASSIUM SERPL-SCNC: 3.9 MMOL/L — SIGNIFICANT CHANGE UP (ref 3.5–5)
PROT SERPL-MCNC: 6.1 G/DL — SIGNIFICANT CHANGE UP (ref 6–8)
PROT SERPL-MCNC: 6.1 G/DL — SIGNIFICANT CHANGE UP (ref 6–8)
RBC # BLD: 4.65 M/UL — LOW (ref 4.7–6.1)
RBC # FLD: 12.2 % — SIGNIFICANT CHANGE UP (ref 11.5–14.5)
SODIUM SERPL-SCNC: 135 MMOL/L — SIGNIFICANT CHANGE UP (ref 135–146)
SODIUM SERPL-SCNC: 136 MMOL/L — SIGNIFICANT CHANGE UP (ref 135–146)
WBC # BLD: 4.99 K/UL — SIGNIFICANT CHANGE UP (ref 4.8–10.8)
WBC # FLD AUTO: 4.99 K/UL — SIGNIFICANT CHANGE UP (ref 4.8–10.8)

## 2021-07-16 PROCEDURE — 99232 SBSQ HOSP IP/OBS MODERATE 35: CPT

## 2021-07-16 RX ORDER — SODIUM CHLORIDE 9 MG/ML
500 INJECTION, SOLUTION INTRAVENOUS ONCE
Refills: 0 | Status: COMPLETED | OUTPATIENT
Start: 2021-07-16 | End: 2021-07-16

## 2021-07-16 RX ORDER — PIOGLITAZONE HYDROCHLORIDE 15 MG/1
30 TABLET ORAL DAILY
Refills: 0 | Status: DISCONTINUED | OUTPATIENT
Start: 2021-07-16 | End: 2021-07-17

## 2021-07-16 RX ORDER — POTASSIUM CHLORIDE 20 MEQ
40 PACKET (EA) ORAL ONCE
Refills: 0 | Status: DISCONTINUED | OUTPATIENT
Start: 2021-07-16 | End: 2021-07-17

## 2021-07-16 RX ADMIN — Medication 3: at 12:30

## 2021-07-16 RX ADMIN — HEPARIN SODIUM 5000 UNIT(S): 5000 INJECTION INTRAVENOUS; SUBCUTANEOUS at 17:29

## 2021-07-16 RX ADMIN — SODIUM CHLORIDE 1000 MILLILITER(S): 9 INJECTION, SOLUTION INTRAVENOUS at 08:41

## 2021-07-16 RX ADMIN — PIOGLITAZONE HYDROCHLORIDE 30 MILLIGRAM(S): 15 TABLET ORAL at 12:30

## 2021-07-16 RX ADMIN — Medication 10 UNIT(S): at 08:24

## 2021-07-16 RX ADMIN — PANTOPRAZOLE SODIUM 40 MILLIGRAM(S): 20 TABLET, DELAYED RELEASE ORAL at 06:15

## 2021-07-16 RX ADMIN — Medication 10 UNIT(S): at 12:29

## 2021-07-16 RX ADMIN — INSULIN GLARGINE 30 UNIT(S): 100 INJECTION, SOLUTION SUBCUTANEOUS at 09:55

## 2021-07-16 RX ADMIN — Medication 1: at 08:24

## 2021-07-16 RX ADMIN — HEPARIN SODIUM 5000 UNIT(S): 5000 INJECTION INTRAVENOUS; SUBCUTANEOUS at 06:16

## 2021-07-16 NOTE — PROGRESS NOTE ADULT - SUBJECTIVE AND OBJECTIVE BOX
OVERNIGHT EVENTS: events noted, on  ns 75 cc/h, afebrile    Vital Signs Last 24 Hrs  T(C): 36.4 (2021 00:00), Max: 36.9 (15 Jul 2021 12:00)  T(F): 97.5 (2021 00:00), Max: 98.4 (15 Jul 2021 12:00)  HR: 72 (2021 07:00) (64 - 90)  BP: 91/43 (2021 07:00) (81/44 - 149/79)  BP(mean): 65 (2021 07:00) (55 - 100)  RR: 15 (2021 07:00) (15 - 34)  SpO2: 99% (2021 07:00) (97% - 100%)    PHYSICAL EXAMINATION:    GENERAL: comfortbale    HEENT: Head is normocephalic and atraumatic.    NECK: Supple.    LUNGS: Clear to auscultation without wheezing, rales or rhonchi; respirations unlabored    HEART: Regular rate and rhythm without murmur.    ABDOMEN: Soft, nontender, and nondistended.      EXTREMITIES: Without any cyanosis, clubbing, rash, lesions or edema.    NEUROLOGIC: Grossly intact.    SKIN: No ulceration or induration present.      LABS:                        12.4   4.99  )-----------( 227      ( 2021 05:10 )             37.9     -    136  |  101  |  10  ----------------------------<  173<H>  3.7   |  21  |  1.3    Ca    8.9      2021 05:10  Mg     1.8     -    TPro  6.1  /  Alb  3.7  /  TBili  0.6  /  DBili  x   /  AST  15  /  ALT  19  /  AlkPhos  68  07-16      Urinalysis Basic - ( 2021 15:37 )    Color: Colorless / Appearance: Clear / S.026 / pH: x  Gluc: x / Ketone: Large  / Bili: Negative / Urobili: <2 mg/dL   Blood: x / Protein: Negative / Nitrite: Negative   Leuk Esterase: Negative / RBC: x / WBC x   Sq Epi: x / Non Sq Epi: x / Bacteria: x        CARDIAC MARKERS ( 15 Jul 2021 11:28 )  x     / <0.01 ng/mL / 91 U/L / x     / 1.1 ng/mL                  07-15-21 @ 07:01  -  21 @ 07:00  --------------------------------------------------------  IN: 1624 mL / OUT: 850 mL / NET: 774 mL        MICROBIOLOGY:      MEDICATIONS  (STANDING):  chlorhexidine 4% Liquid 1 Application(s) Topical <User Schedule>  dextrose 40% Gel 15 Gram(s) Oral once  dextrose 5%. 1000 milliLiter(s) (100 mL/Hr) IV Continuous <Continuous>  dextrose 5%. 1000 milliLiter(s) (50 mL/Hr) IV Continuous <Continuous>  dextrose 50% Injectable 25 Gram(s) IV Push once  dextrose 50% Injectable 12.5 Gram(s) IV Push once  dextrose 50% Injectable 25 Gram(s) IV Push once  glucagon  Injectable 1 milliGRAM(s) IntraMuscular once  heparin   Injectable 5000 Unit(s) SubCutaneous every 12 hours  insulin glargine Injectable (LANTUS) 30 Unit(s) SubCutaneous every morning  insulin lispro (ADMELOG) corrective regimen sliding scale   SubCutaneous three times a day before meals  insulin lispro Injectable (ADMELOG) 10 Unit(s) SubCutaneous three times a day before meals  pantoprazole    Tablet 40 milliGRAM(s) Oral before breakfast  sodium chloride 0.45%. 1000 milliLiter(s) (75 mL/Hr) IV Continuous <Continuous>    MEDICATIONS  (PRN):  aluminum hydroxide/magnesium hydroxide/simethicone Suspension 30 milliLiter(s) Oral every 4 hours PRN Dyspepsia  ondansetron Injectable 4 milliGRAM(s) IV Push every 8 hours PRN Nausea and/or Vomiting      RADIOLOGY & ADDITIONAL STUDIES:

## 2021-07-16 NOTE — DISCHARGE NOTE NURSING/CASE MANAGEMENT/SOCIAL WORK - PATIENT PORTAL LINK FT
You can access the FollowMyHealth Patient Portal offered by [facility] by registering at the following website: [email or web address]. By joining [facility]’s FollowMyHealth portal, you will also be able to view your health information using other applications (apps) compatible with our system.

## 2021-07-16 NOTE — CHART NOTE - NSCHARTNOTEFT_GEN_A_CORE
Spoke with  at bedside with patient. Pt will need lancets, test strips, alcohol wipes, and glucometer sent to [facility] here [facility] in the am so patient can have them for discharge.

## 2021-07-16 NOTE — PROGRESS NOTE ADULT - ASSESSMENT
IMPRESSION:    DKA ? compliance  DEMIAN improved  lawrence    PLAN:    CNS: avoid CNS depressant    HEENT:  Oral care    PULMONARY:  HOB @ 45 degrees, aspiration precaution    CARDIOVASCULAR: IVF    GI: GI prophylaxis                                          Feeding PO    RENAL:  F/u  lytes.  Correct as needed. accurate I/O    INFECTIOUS DISEASE: F/UP CX    HEMATOLOGICAL:  DVT prophylaxis.    ENDOCRINE:  Follow up FS.  Insulin protocol if needed. ENDO F/UP    CODE STATUS: FULL CODE    DISPOSITION: FLOOR  OOB TO CHAIR

## 2021-07-16 NOTE — PROGRESS NOTE ADULT - ASSESSMENT
severe obesity, and uncontrolled type 2 diabetes. pioglitazone 30 mg. stat now, and then once a day. can go home on tresiba u-200 flextouch 40 units daily or toujeo max solostar 40 units daily, this can be give anytime. he needs 1 box of pen needles, one touch verio flex meter, test strips and lancets or if not covered an alternative. continue pioglitazone at discharge, check fingerstick glucoses no more than twice daily. follow up with dr hope  next week,. his pharmacy is [facility], but may be better to use [facility] where they can apply coupons for the insulin, and he can  his meds at discharge, and all his suppl;ies will be cheaper at [facility]. please call dr hope  with problems.

## 2021-07-16 NOTE — PROGRESS NOTE ADULT - SUBJECTIVE AND OBJECTIVE BOX
Reason for Endocrinology Consult: Diabetes    HPI: 31y Male        PAST MEDICAL & SURGICAL HISTORY:  DM (diabetes mellitus)      FAMILY HISTORY:      SH:  Smoking  Etoh:  Recreational Drugs:    Home Medications:      Current (Non-Endocrine) Meds:  aluminum hydroxide/magnesium hydroxide/simethicone Suspension 30 milliLiter(s) Oral every 4 hours PRN  chlorhexidine 4% Liquid 1 Application(s) Topical <User Schedule>  dextrose 5%. 1000 milliLiter(s) IV Continuous <Continuous>  dextrose 5%. 1000 milliLiter(s) IV Continuous <Continuous>  heparin   Injectable 5000 Unit(s) SubCutaneous every 12 hours  ondansetron Injectable 4 milliGRAM(s) IV Push every 8 hours PRN  pantoprazole    Tablet 40 milliGRAM(s) Oral before breakfast  sodium chloride 0.45%. 1000 milliLiter(s) IV Continuous <Continuous>      Current Endocrine Meds:   dextrose 40% Gel 15 Gram(s) Oral once  dextrose 50% Injectable 25 Gram(s) IV Push once  dextrose 50% Injectable 12.5 Gram(s) IV Push once  dextrose 50% Injectable 25 Gram(s) IV Push once  glucagon  Injectable 1 milliGRAM(s) IntraMuscular once  insulin glargine Injectable (LANTUS) 30 Unit(s) SubCutaneous every morning  insulin lispro (ADMELOG) corrective regimen sliding scale   SubCutaneous three times a day before meals  insulin lispro Injectable (ADMELOG) 10 Unit(s) SubCutaneous three times a day before meals      Allergies:  No Known Allergies      ROS:  Denies the following except as indicated.    General: weight loss/weight gain, decreased appetite, fatigue, fever  Eyes: blurry vision, double vision  ENT: neck swelling, dysphagia, voice changes   CV: palpitations, SOB, chest pain, cough  GI: nausea, vomiting, diarrhea, constipation, abdominal pain  : nocturia,  polyuria, dysuria  Endo: decreased libido, heat/cold intolerance, jitteriness  MSK: arthralgias, myalgias  Skin: rash, dryness, diaphoresis  Neuro: pedal numbness,pedal paresthesias, pedal pain    Height (cm): 180.3 ( @ 14:11)  Weight (kg): 124.284 ( @ 16:04)  BMI (kg/m2): 38.2 ( @ 16:04)    Vital Signs Last 24 Hrs  T(C): 36.4 (2021 00:00), Max: 36.9 (15 Jul 2021 12:00)  T(F): 97.5 (2021 00:00), Max: 98.4 (15 Jul 2021 12:00)  HR: 72 (2021 07:00) (64 - 90)  BP: 91/43 (2021 07:00) (81/44 - 149/79)  BP(mean): 65 (2021 07:00) (55 - 100)  RR: 15 (2021 07:00) (15 - 34)  SpO2: 99% (2021 07:00) (97% - 100%)  Constitutional: WN/WD in NAD.   Neck: no thyromegaly or palpable thyroid nodules   Respiratory: lungs CTAB.  Cardiovascular: regular rate and rhythm, normal S1 and S2, no audible murmurs  GI: soft, NT/ND, no masses/HSM appreciated.  Ext: no edema, no ulcers, pedal pulses palpable bilaterally  Neurology: no tremor, monofilament sensation intact in feet  Psychiatric: A&O x 3, normal affect/mood.        LABS:                        12.4   4.99  )-----------( 227      ( 2021 05:10 )             37.9     07-16    136  |  101  |  10  ----------------------------<  173<H>  3.7   |  21  |  1.3    Ca    8.9      2021 05:10  Mg     1.8     07-16    TPro  6.1  /  Alb  3.7  /  TBili  0.6  /  DBili  x   /  AST  15  /  ALT  19  /  AlkPhos  68  07-16      Urinalysis Basic - ( 2021 15:37 )    Color: Colorless / Appearance: Clear / S.026 / pH: x  Gluc: x / Ketone: Large  / Bili: Negative / Urobili: <2 mg/dL   Blood: x / Protein: Negative / Nitrite: Negative   Leuk Esterase: Negative / RBC: x / WBC x   Sq Epi: x / Non Sq Epi: x / Bacteria: x                                     RADIOLOGY & ADDITIONAL STUDIES:    A/P:31yMale

## 2021-07-16 NOTE — PROGRESS NOTE ADULT - SUBJECTIVE AND OBJECTIVE BOX
EDWARD AGUIAR 31y Male  MRN#: [id number]   CODE STATUS: full code     Hospital Day: 2d    Pt is currently admitted with the primary diagnosis of DKA    SUBJECTIVE  Hospital Course    Overnight events     Subjective complaints     Present Today:   - Theron:  No [ x ], Yes [   ] : Indication:                                                 ----------------------------------------------------------  OBJECTIVE  PAST MEDICAL & SURGICAL HISTORY  DM (diabetes mellitus)                                              -----------------------------------------------------------  ALLERGIES:  No Known Allergies                                            ------------------------------------------------------------    HOME MEDICATIONS  Home Medications:                           MEDICATIONS:  STANDING MEDICATIONS  chlorhexidine 4% Liquid 1 Application(s) Topical <User Schedule>  dextrose 40% Gel 15 Gram(s) Oral once  dextrose 5%. 1000 milliLiter(s) IV Continuous <Continuous>  dextrose 5%. 1000 milliLiter(s) IV Continuous <Continuous>  dextrose 50% Injectable 25 Gram(s) IV Push once  dextrose 50% Injectable 12.5 Gram(s) IV Push once  dextrose 50% Injectable 25 Gram(s) IV Push once  glucagon  Injectable 1 milliGRAM(s) IntraMuscular once  heparin   Injectable 5000 Unit(s) SubCutaneous every 12 hours  insulin glargine Injectable (LANTUS) 30 Unit(s) SubCutaneous every morning  insulin lispro (ADMELOG) corrective regimen sliding scale   SubCutaneous three times a day before meals  insulin lispro Injectable (ADMELOG) 10 Unit(s) SubCutaneous three times a day before meals  pantoprazole    Tablet 40 milliGRAM(s) Oral before breakfast    PRN MEDICATIONS  aluminum hydroxide/magnesium hydroxide/simethicone Suspension 30 milliLiter(s) Oral every 4 hours PRN  ondansetron Injectable 4 milliGRAM(s) IV Push every 8 hours PRN                                            ------------------------------------------------------------  VITAL SIGNS: Last 24 Hours  T(C): 36.8 (2021 08:00), Max: 36.9 (15 Jul 2021 12:00)  T(F): 98.2 (2021 08:00), Max: 98.4 (15 Jul 2021 12:00)  HR: 78 (2021 08:00) (64 - 90)  BP: 116/75 (2021 08:00) (81/44 - 149/79)  BP(mean): 90 (2021 08:00) (55 - 100)  RR: 21 (2021 08:00) (15 - 34)  SpO2: 98% (2021 08:00) (97% - 100%)      07-15-21 @ 07:  -  21 @ 07:00  --------------------------------------------------------  IN: 1624 mL / OUT: 850 mL / NET: 774 mL    21 @ 07:01  21 @ 09:43  --------------------------------------------------------  IN: 120 mL / OUT: 0 mL / NET: 120 mL                                             --------------------------------------------------------------  LABS:                        12.4   4.99  )-----------( 227      ( 2021 05:10 )             37.9     07-16    136  |  101  |  10  ----------------------------<  173<H>  3.7   |  21  |  1.3    Ca    8.9      2021 05:10  Mg     1.8         TPro  6.1  /  Alb  3.7  /  TBili  0.6  /  DBili  x   /  AST  15  /  ALT  19  /  AlkPhos  68        Urinalysis Basic - ( 2021 15:37 )    Color: Colorless / Appearance: Clear / S.026 / pH: x  Gluc: x / Ketone: Large  / Bili: Negative / Urobili: <2 mg/dL   Blood: x / Protein: Negative / Nitrite: Negative   Leuk Esterase: Negative / RBC: x / WBC x   Sq Epi: x / Non Sq Epi: x / Bacteria: x        Creatine Kinase, Serum: 91 U/L (07-15-21 @ 11:28)  Troponin T, Serum: <0.01 ng/mL (07-15-21 @ 11:28)          CARDIAC MARKERS ( 15 Jul 2021 11:28 )  x     / <0.01 ng/mL / 91 U/L / x     / 1.1 ng/mL                                              -------------------------------------------------------------  RADIOLOGY:      EXAM:  XR CHEST PA LAT 2V            PROCEDURE DATE:  2021            INTERPRETATION:  Clinical History / Reason for exam: Shortness of breath    Comparison : Chest radiograph 2021.    Technique/Positioning: PA and lateral views of the chest.    Findings:    Support devices: None.    Cardiac/mediastinum/hilum: Unremarkable.    Lung parenchyma/Pleura: No consolidation, effusion or pneumothorax.    Skeleton/soft tissues: Unremarkable.    Impression:    No radiographic evidence of acute cardiopulmonary disease.        --- End of Report ---      `  Ventricular Rate 94 BPM    Atrial Rate 94 BPM    P-R Interval 142 ms    QRS Duration 82 ms    Q-T Interval 338 ms    QTC Calculation(Bazett) 422 ms    P Axis 54 degrees    R Axis 36 degrees    T Axis 20 degrees    Diagnosis Line Normal sinus rhythm  Early repolarization  Normal ECG    Confirmed by Gilmar Zapata ([id number]) on 2021 6:20:48 PM              `                                        --------------------------------------------------------------    PHYSICAL EXAM:  General: in no distress   HEENT: NCAT  LUNGS: CTAB, Good air entry bilat  HEART: RRR, +S1,S2, RRR  ABDOMEN: SNTTP, ND x 4 q's  EXT: Warm, well perfused x 4  NEURO: AxOx3, No FND's noted  SKIN: No new breakdown or rashes noted                                           --------------------------------------------------------------     EDWARD AGUIAR 31y Male  MRN#: [id number]   CODE STATUS: full code     Hospital Day: 2d    Pt is currently admitted with the primary diagnosis of DKA    SUBJECTIVE  no events overnight,      Present Today:   - Theron:  No [ x ], Yes [   ] : Indication:                                                 ----------------------------------------------------------  OBJECTIVE  PAST MEDICAL & SURGICAL HISTORY  DM (diabetes mellitus)                                              -----------------------------------------------------------  ALLERGIES:  No Known Allergies                                            ------------------------------------------------------------    HOME MEDICATIONS  Home Medications:                           MEDICATIONS:  STANDING MEDICATIONS  chlorhexidine 4% Liquid 1 Application(s) Topical <User Schedule>  dextrose 40% Gel 15 Gram(s) Oral once  dextrose 5%. 1000 milliLiter(s) IV Continuous <Continuous>  dextrose 5%. 1000 milliLiter(s) IV Continuous <Continuous>  dextrose 50% Injectable 25 Gram(s) IV Push once  dextrose 50% Injectable 12.5 Gram(s) IV Push once  dextrose 50% Injectable 25 Gram(s) IV Push once  glucagon  Injectable 1 milliGRAM(s) IntraMuscular once  heparin   Injectable 5000 Unit(s) SubCutaneous every 12 hours  insulin glargine Injectable (LANTUS) 30 Unit(s) SubCutaneous every morning  insulin lispro (ADMELOG) corrective regimen sliding scale   SubCutaneous three times a day before meals  insulin lispro Injectable (ADMELOG) 10 Unit(s) SubCutaneous three times a day before meals  pantoprazole    Tablet 40 milliGRAM(s) Oral before breakfast    PRN MEDICATIONS  aluminum hydroxide/magnesium hydroxide/simethicone Suspension 30 milliLiter(s) Oral every 4 hours PRN  ondansetron Injectable 4 milliGRAM(s) IV Push every 8 hours PRN                                            ------------------------------------------------------------  VITAL SIGNS: Last 24 Hours  T(C): 36.8 (2021 08:00), Max: 36.9 (15 Jul 2021 12:00)  T(F): 98.2 (2021 08:00), Max: 98.4 (15 Jul 2021 12:00)  HR: 78 (2021 08:00) (64 - 90)  BP: 116/75 (2021 08:00) (81/44 - 149/79)  BP(mean): 90 (2021 08:00) (55 - 100)  RR: 21 (2021 08:00) (15 - 34)  SpO2: 98% (2021 08:00) (97% - 100%)      07-15-21 @ 07:  -  21 @ 07:00  --------------------------------------------------------  IN: 1624 mL / OUT: 850 mL / NET: 774 mL    21 @ 07:01  21 @ 09:43  --------------------------------------------------------  IN: 120 mL / OUT: 0 mL / NET: 120 mL                                             --------------------------------------------------------------  LABS:                        12.4   4.99  )-----------( 227      ( 2021 05:10 )             37.9     07-16    136  |  101  |  10  ----------------------------<  173<H>  3.7   |  21  |  1.3    Ca    8.9      2021 05:10  Mg     1.8         TPro  6.1  /  Alb  3.7  /  TBili  0.6  /  DBili  x   /  AST  15  /  ALT  19  /  AlkPhos  68        Urinalysis Basic - ( 2021 15:37 )    Color: Colorless / Appearance: Clear / S.026 / pH: x  Gluc: x / Ketone: Large  / Bili: Negative / Urobili: <2 mg/dL   Blood: x / Protein: Negative / Nitrite: Negative   Leuk Esterase: Negative / RBC: x / WBC x   Sq Epi: x / Non Sq Epi: x / Bacteria: x        Creatine Kinase, Serum: 91 U/L (07-15-21 @ 11:28)  Troponin T, Serum: <0.01 ng/mL (07-15-21 @ 11:28)          CARDIAC MARKERS ( 15 Jul 2021 11:28 )  x     / <0.01 ng/mL / 91 U/L / x     / 1.1 ng/mL                                              -------------------------------------------------------------  RADIOLOGY:      EXAM:  XR CHEST PA LAT 2V            PROCEDURE DATE:  2021            INTERPRETATION:  Clinical History / Reason for exam: Shortness of breath    Comparison : Chest radiograph 2021.    Technique/Positioning: PA and lateral views of the chest.    Findings:    Support devices: None.    Cardiac/mediastinum/hilum: Unremarkable.    Lung parenchyma/Pleura: No consolidation, effusion or pneumothorax.    Skeleton/soft tissues: Unremarkable.    Impression:    No radiographic evidence of acute cardiopulmonary disease.        --- End of Report ---      `  Ventricular Rate 94 BPM    Atrial Rate 94 BPM    P-R Interval 142 ms    QRS Duration 82 ms    Q-T Interval 338 ms    QTC Calculation(Bazett) 422 ms    P Axis 54 degrees    R Axis 36 degrees    T Axis 20 degrees    Diagnosis Line Normal sinus rhythm  Early repolarization  Normal ECG    Confirmed by Gilmar Zapata ([id number]) on 2021 6:20:48 PM              `                                        --------------------------------------------------------------    PHYSICAL EXAM:  General: in no distress   HEENT: NCAT  LUNGS: CTAB, Good air entry bilat  HEART: RRR, +S1,S2, RRR  ABDOMEN: SNTTP, ND x 4 q's  EXT: Warm, well perfused x 4  NEURO: AxOx3, No FND's noted  SKIN: No new breakdown or rashes noted                                           --------------------------------------------------------------

## 2021-07-16 NOTE — PROGRESS NOTE ADULT - ASSESSMENT
ASSESSMENT & PLAN      30 yo M with PMH of DM presents to ED for hyperglycemia and weakness, non compliant with home diabetes medications,  found to be in DKA      In the Ed given bolus IVF, started on insulin drip and ns.  labs significant for   potassium 6.6, bicarb 12, AG 28 BHB 7.5.   Admitted to ICU for treatment of DKA.     s/p insulin drip started at 7 units per hour and D51/2NS at 150cc/h    now transitioned to subq insulin regimen 30/10/10/10    CMP this am anion gap of 14, bicarb of 21, and glucose of 178.       # HAGMA likely secondary to DKA- resolved   - marta stable, anion gap improved,   - BP soft continue IVF 1/2ns at   - monitor vitals   - CMP at 11am   -will transition off insulin drip to subq if anion gap is closed,   --glargine 30, lispro 10/10/10  -will transition from d5 1/2 ns to 1/2 ns at 75ml   - lactate trended down   -f/u a1c  -endo consult   -f/u trop at 11am     # DEMIAN   - cr 1.3 during last admission, today 1.9 now at 1.5 resolving  - c/w IVF  - monitor cmps,   -avoid nephrotoxic medications   -renally dose meds     # Hyperkalemia   - K 6.6, hemolyzed, now 3.7 continue to monitor replete as needed   - f/u 11am CMP    # DVT ppx- hep subq 5000 q12  # Activity- AAT  # Diet- carb consistent   # Code status - full  # Dispo- step down to floors with continued improvement                                                                                                                                           ----------------------------------------------------  # DVT prophylaxis     # GI prophylaxis     # Diet     # Activity Score (AM-PAC)    # Code status     # Disposition                                                                              --------------------------------------------------------    # Handoff        ASSESSMENT & PLAN      30 yo M with PMH of DM presents to ED for hyperglycemia and weakness, non compliant with home diabetes medications, found to be in DKA with potassium 6.6, bicarb 12, AG 28 BHB 7.5.     Admitted to ICU for treatment of DKA.     s/p insulin drip started at 7 units per hour and D5 1/2NS at 150cc/h    now transitioned to subq insulin regimen 30/10/10/10, and off IV fluids     CMP this am anion gap of 14, bicarb of 21, and glucose of 178.       # HAGMA secondary to DKA- resolved    #uncontrolled Diabetes type 2   a1c 12.7   - marta stable, anion gap improved,   - BP soft s/p IVF LR 500ml bolus   - monitor vitals   - f/u CMP  - glargine 30, lispro 10/10/10  - f/u endo consult regarding discharge medications       # DEMIAN -  in setting of DKA dehydration, resolvin cr 1.3 today, appears to be his baseline   - monitor cmp   -avoid nephrotoxic medications   -renally dose meds     # Hyperkalemia - resolved    - 3.7 today, continue to monitor replete as needed   - f/u CMP    #obesity   -education provided   -outpatient f/u     # DVT ppx- hep subq 5000 q12  # Activity- AAT  # Diet- carb consistent   # Code status - full  # Dispo- step down to floors today                                                            ASSESSMENT & PLAN      32 yo M with PMH of DM presents to ED for hyperglycemia and weakness, non compliant with home diabetes medications, found to be in DKA with potassium 6.6, bicarb 12, AG 28 BHB 7.5.     Admitted to ICU for treatment of DKA.     s/p insulin drip started at 7 units per hour and D5 1/2NS at 150cc/h    now transitioned to subq insulin regimen 30/10/10/10, and off IV fluids     CMP this am anion gap of 14, bicarb of 21, and glucose of 178.       # HAGMA secondary to DKA- resolved    #uncontrolled Diabetes type 2   a1c 12.7   - marta stable, anion gap improved,   - BP soft s/p IVF LR 500ml bolus   - monitor vitals   - f/u CMP  - glargine 30, lispro 10/10/10  -pioglitazone 30mg pO daily   - f/u endo consult regarding discharge medications       # DEMIAN -  in setting of DKA dehydration, resolvin cr 1.3 today, appears to be his baseline   - monitor cmp   -avoid nephrotoxic medications   -renally dose meds     # Hyperkalemia - resolved    - 3.7 today, continue to monitor replete as needed   - f/u CMP    #obesity   -education provided   -outpatient f/u     # DVT ppx- hep subq 5000 q12  # Activity- AAT  # Diet- carb consistent   # Code status - full  # Dispo- step down to floors today

## 2021-07-17 VITALS
RESPIRATION RATE: 20 BRPM | DIASTOLIC BLOOD PRESSURE: 59 MMHG | SYSTOLIC BLOOD PRESSURE: 117 MMHG | HEART RATE: 72 BPM | TEMPERATURE: 97 F

## 2021-07-17 LAB
ALBUMIN SERPL ELPH-MCNC: 3.8 G/DL — SIGNIFICANT CHANGE UP (ref 3.5–5.2)
ALP SERPL-CCNC: 74 U/L — SIGNIFICANT CHANGE UP (ref 30–115)
ALT FLD-CCNC: 22 U/L — SIGNIFICANT CHANGE UP (ref 0–41)
ANION GAP SERPL CALC-SCNC: 11 MMOL/L — SIGNIFICANT CHANGE UP (ref 7–14)
AST SERPL-CCNC: 17 U/L — SIGNIFICANT CHANGE UP (ref 0–41)
BASOPHILS # BLD AUTO: 0.03 K/UL — SIGNIFICANT CHANGE UP (ref 0–0.2)
BASOPHILS NFR BLD AUTO: 0.8 % — SIGNIFICANT CHANGE UP (ref 0–1)
BILIRUB SERPL-MCNC: 0.6 MG/DL — SIGNIFICANT CHANGE UP (ref 0.2–1.2)
BUN SERPL-MCNC: 9 MG/DL — LOW (ref 10–20)
CALCIUM SERPL-MCNC: 9.3 MG/DL — SIGNIFICANT CHANGE UP (ref 8.5–10.1)
CHLORIDE SERPL-SCNC: 101 MMOL/L — SIGNIFICANT CHANGE UP (ref 98–110)
CO2 SERPL-SCNC: 24 MMOL/L — SIGNIFICANT CHANGE UP (ref 17–32)
CREAT SERPL-MCNC: 1.1 MG/DL — SIGNIFICANT CHANGE UP (ref 0.7–1.5)
EOSINOPHIL # BLD AUTO: 0.11 K/UL — SIGNIFICANT CHANGE UP (ref 0–0.7)
EOSINOPHIL NFR BLD AUTO: 2.9 % — SIGNIFICANT CHANGE UP (ref 0–8)
GLUCOSE BLDC GLUCOMTR-MCNC: 203 MG/DL — HIGH (ref 70–99)
GLUCOSE BLDC GLUCOMTR-MCNC: 369 MG/DL — HIGH (ref 70–99)
GLUCOSE SERPL-MCNC: 221 MG/DL — HIGH (ref 70–99)
HCT VFR BLD CALC: 36.8 % — LOW (ref 42–52)
HGB BLD-MCNC: 12.3 G/DL — LOW (ref 14–18)
IMM GRANULOCYTES NFR BLD AUTO: 0.3 % — SIGNIFICANT CHANGE UP (ref 0.1–0.3)
LYMPHOCYTES # BLD AUTO: 1.87 K/UL — SIGNIFICANT CHANGE UP (ref 1.2–3.4)
LYMPHOCYTES # BLD AUTO: 49.9 % — SIGNIFICANT CHANGE UP (ref 20.5–51.1)
MAGNESIUM SERPL-MCNC: 1.8 MG/DL — SIGNIFICANT CHANGE UP (ref 1.8–2.4)
MCHC RBC-ENTMCNC: 27.5 PG — SIGNIFICANT CHANGE UP (ref 27–31)
MCHC RBC-ENTMCNC: 33.4 G/DL — SIGNIFICANT CHANGE UP (ref 32–37)
MCV RBC AUTO: 82.1 FL — SIGNIFICANT CHANGE UP (ref 80–94)
MONOCYTES # BLD AUTO: 0.5 K/UL — SIGNIFICANT CHANGE UP (ref 0.1–0.6)
MONOCYTES NFR BLD AUTO: 13.3 % — HIGH (ref 1.7–9.3)
NEUTROPHILS # BLD AUTO: 1.23 K/UL — LOW (ref 1.4–6.5)
NEUTROPHILS NFR BLD AUTO: 32.8 % — LOW (ref 42.2–75.2)
NRBC # BLD: 0 /100 WBCS — SIGNIFICANT CHANGE UP (ref 0–0)
PLATELET # BLD AUTO: 219 K/UL — SIGNIFICANT CHANGE UP (ref 130–400)
POTASSIUM SERPL-MCNC: 3.8 MMOL/L — SIGNIFICANT CHANGE UP (ref 3.5–5)
POTASSIUM SERPL-SCNC: 3.8 MMOL/L — SIGNIFICANT CHANGE UP (ref 3.5–5)
PROT SERPL-MCNC: 6 G/DL — SIGNIFICANT CHANGE UP (ref 6–8)
RBC # BLD: 4.48 M/UL — LOW (ref 4.7–6.1)
RBC # FLD: 12 % — SIGNIFICANT CHANGE UP (ref 11.5–14.5)
SODIUM SERPL-SCNC: 136 MMOL/L — SIGNIFICANT CHANGE UP (ref 135–146)
WBC # BLD: 3.75 K/UL — LOW (ref 4.8–10.8)
WBC # FLD AUTO: 3.75 K/UL — LOW (ref 4.8–10.8)

## 2021-07-17 PROCEDURE — 99239 HOSP IP/OBS DSCHRG MGMT >30: CPT

## 2021-07-17 RX ORDER — PIOGLITAZONE HYDROCHLORIDE 15 MG/1
1 TABLET ORAL
Qty: 30 | Refills: 0
Start: 2021-07-17 | End: 2021-08-15

## 2021-07-17 RX ORDER — INSULIN DEGLUDEC 100 U/ML
40 INJECTION, SOLUTION SUBCUTANEOUS
Qty: 0 | Refills: 0 | DISCHARGE

## 2021-07-17 RX ORDER — ISOPROPYL ALCOHOL, BENZOCAINE .7; .06 ML/ML; ML/ML
1 SWAB TOPICAL
Qty: 100 | Refills: 1
Start: 2021-07-17 | End: 2021-09-04

## 2021-07-17 RX ORDER — INSULIN GLARGINE 100 [IU]/ML
40 INJECTION, SOLUTION SUBCUTANEOUS
Qty: 0 | Refills: 0 | DISCHARGE

## 2021-07-17 RX ADMIN — CHLORHEXIDINE GLUCONATE 1 APPLICATION(S): 213 SOLUTION TOPICAL at 05:43

## 2021-07-17 RX ADMIN — PANTOPRAZOLE SODIUM 40 MILLIGRAM(S): 20 TABLET, DELAYED RELEASE ORAL at 07:00

## 2021-07-17 RX ADMIN — Medication 5: at 11:15

## 2021-07-17 RX ADMIN — PIOGLITAZONE HYDROCHLORIDE 30 MILLIGRAM(S): 15 TABLET ORAL at 11:16

## 2021-07-17 RX ADMIN — HEPARIN SODIUM 5000 UNIT(S): 5000 INJECTION INTRAVENOUS; SUBCUTANEOUS at 05:43

## 2021-07-17 RX ADMIN — Medication 10 UNIT(S): at 11:15

## 2021-07-17 NOTE — DISCHARGE NOTE PROVIDER - NSDCCPCAREPLAN_GEN_ALL_CORE_FT
PRINCIPAL DISCHARGE DIAGNOSIS  Diagnosis: Diabetic ketoacidosis  Assessment and Plan of Treatment: You were found to have very elevated glucose which led to an acidotic state of the body requiring insulin drip and constant monitoring of the glucose. These episodes are triggered by either medication noncompliance, poor dietary compliance, or infection. Please follow up with your doctor and endocrinology for further management and monitoring.        
no,

## 2021-07-17 NOTE — PROGRESS NOTE ADULT - SUBJECTIVE AND OBJECTIVE BOX
Reason for Endocrinology Consult: Diabetes    HPI: 31y Male    Diabetes Type:  Duration of Diabetes:  Diabetes Complications:  History of DKA?  Eye doctor within past year?  Current Therapy:      Home FSG:  Fasting:  Lunch:  Dinner:  Bedtime:    Hypoglycemia?    Neuroglycopenia within past year?    Outpatient Endocrinologist?    PAST MEDICAL & SURGICAL HISTORY:  DM (diabetes mellitus)      FAMILY HISTORY:      SH:  Smoking  Etoh:  Recreational Drugs:    Home Medications:      Current (Non-Endocrine) Meds:  aluminum hydroxide/magnesium hydroxide/simethicone Suspension 30 milliLiter(s) Oral every 4 hours PRN  chlorhexidine 4% Liquid 1 Application(s) Topical <User Schedule>  dextrose 5%. 1000 milliLiter(s) IV Continuous <Continuous>  dextrose 5%. 1000 milliLiter(s) IV Continuous <Continuous>  heparin   Injectable 5000 Unit(s) SubCutaneous every 12 hours  ondansetron Injectable 4 milliGRAM(s) IV Push every 8 hours PRN  pantoprazole    Tablet 40 milliGRAM(s) Oral before breakfast  potassium chloride    Tablet ER 40 milliEquivalent(s) Oral once      Current Endocrine Meds:   dextrose 40% Gel 15 Gram(s) Oral once  dextrose 50% Injectable 25 Gram(s) IV Push once  dextrose 50% Injectable 12.5 Gram(s) IV Push once  dextrose 50% Injectable 25 Gram(s) IV Push once  glucagon  Injectable 1 milliGRAM(s) IntraMuscular once  insulin glargine Injectable (LANTUS) 30 Unit(s) SubCutaneous every morning  insulin lispro (ADMELOG) corrective regimen sliding scale   SubCutaneous three times a day before meals  insulin lispro Injectable (ADMELOG) 10 Unit(s) SubCutaneous three times a day before meals  pioglitazone 30 milliGRAM(s) Oral daily      Allergies:  No Known Allergies      ROS:  Denies the following except as indicated.    General: weight loss/weight gain, decreased appetite, fatigue, fever  Eyes: blurry vision, double vision  ENT: neck swelling, dysphagia, voice changes   CV: palpitations, SOB, chest pain, cough  GI: nausea, vomiting, diarrhea, constipation, abdominal pain  : nocturia,  polyuria, dysuria  Endo: decreased libido, heat/cold intolerance, jitteriness  MSK: arthralgias, myalgias  Skin: rash, dryness, diaphoresis  Neuro: pedal numbness,pedal paresthesias, pedal pain    Height (cm): 180.3 (07-14 @ 14:11)  Weight (kg): 124.284 (07-14 @ 16:04)  BMI (kg/m2): 38.2 (07-14 @ 16:04)    Vital Signs Last 24 Hrs  T(C): 36.7 (17 Jul 2021 00:00), Max: 36.8 (16 Jul 2021 08:00)  T(F): 98 (17 Jul 2021 00:00), Max: 98.2 (16 Jul 2021 08:00)  HR: 72 (17 Jul 2021 00:00) (72 - 80)  BP: 120/57 (17 Jul 2021 00:00) (110/52 - 126/56)  BP(mean): 65 (16 Jul 2021 10:00) (59 - 90)  RR: 20 (17 Jul 2021 00:00) (20 - 22)  SpO2: 95% (16 Jul 2021 13:55) (95% - 100%)  Constitutional: WN/WD in NAD.   Neck: no thyromegaly or palpable thyroid nodules   Respiratory: lungs CTAB.  Cardiovascular: regular rate and rhythm, normal S1 and S2, no audible murmurs  GI: soft, NT/ND, no masses/HSM appreciated.  Ext: no edema, no ulcers, pedal pulses palpable bilaterally  Neurology: no tremor, monofilament sensation intact in feet  Psychiatric: A&O x 3, normal affect/mood.        LABS:                        12.4   4.99  )-----------( 227      ( 16 Jul 2021 05:10 )             37.9     07-16    136  |  101  |  10  ----------------------------<  173<H>  3.7   |  21  |  1.3    Ca    8.9      16 Jul 2021 05:10  Mg     1.8     07-16    TPro  6.1  /  Alb  3.7  /  TBili  0.6  /  DBili  x   /  AST  15  /  ALT  19  /  AlkPhos  68  07-16                                       RADIOLOGY & ADDITIONAL STUDIES:    A/P:31yMale    1.  DM  For now:  Glargine-    units at bedtime  Lispro-    units three times a day before meals   Will continue to monitor     At discharge, recommend:      Pt can follow up at discharge with:    Above discussed with resident.

## 2021-07-17 NOTE — DISCHARGE NOTE PROVIDER - PROVIDER TOKENS
PROVIDER:[TOKEN:[[id number]],FOLLOWUP:[1 week]] PROVIDER:[TOKEN:[[id number]],FOLLOWUP:[1 week]],PROVIDER:[TOKEN:[[id number]],FOLLOWUP:[2 weeks]]

## 2021-07-17 NOTE — DISCHARGE NOTE PROVIDER - CARE PROVIDER_API CALL
Jeanie Shannon  INTERNAL MEDICINE  [address] [place] [address]  Phone: [phone]  Fax: [phone]  Follow Up Time: 1 week   Jeanie Shannon  INTERNAL MEDICINE  [address] [facility] [place] [address]  Phone: [phone]  Fax: [phone]  Follow Up Time: 1 week    Viky Bernard  INTERNAL MEDICINE  [address] [place] [address]  Phone: [phone]  Fax: [phone]  Follow Up Time: 2 weeks

## 2021-07-17 NOTE — DISCHARGE NOTE PROVIDER - NSDCMRMEDTOKEN_GEN_ALL_CORE_FT
alcohol swabs : Apply topically to affected area 4 times a day   Insulin Pen Needles, 4mm: 1 application subcutaneously 4 times a day. ** Use with insulin pen **   lancets: 1 application subcutaneously 4 times a day   pioglitazone 30 mg oral tablet: 1 tab(s) orally once a day  test strips (per patient&#x27;s insurance): 1 application subcutaneously 4 times a day. ** Compatible with patient&#x27;s glucometer **  One touch verio flex meter  Tresiba FlexTouch 100 units/mL subcutaneous solution: 40 unit(s) subcutaneous once a day

## 2021-07-17 NOTE — DISCHARGE NOTE PROVIDER - CARE PROVIDERS DIRECT ADDRESSES
[email or web address]

## 2021-07-17 NOTE — PROGRESS NOTE ADULT - ASSESSMENT
type 2 dm, can go home today, he took 40 units of tresiba u-100 this morning, and has a free sample so does not need insulin prescription, just needs pioglitazone, one touch verio test strips, one touch delica plus lancets, if not covered, switch to a different meter and test strips. needs 1 box of pen needles. will follow up with dr hope tuwendyday next week. if hypoglycemia, reduce insulin dosage by half.

## 2021-07-17 NOTE — DISCHARGE NOTE PROVIDER - HOSPITAL COURSE
30 yo M with PMH of DM presents to ED for hyperglycemia and weakness. Pt was in the hospital a couple of weeks ago for hyperglycemia and was dc home on metformin BID. However, he states it causes him constipation and he only takes it once a day. For the past few days he has had weakness and feels more fatigued. No polyuria or polydipsia. Pt had one episode of emesis yesterday and one today. No abdominal pain. No fevers or chills. In the Ed given bolus IVF, started on insulin drip and ns. potassium 6.6, bicarb 12, AG 28 BHB 7.5.     Admitted to MICU for treatment of DKA where he was placed on insulin drip at 7 units per hour and D5 1/2NS at 150 ml/hour. Anion Gap steadily improve and serum bicarbonate increased. He was then transitioned to subq insulin at 30 glargine, and 10/10/10 of lispro, insulin drip was discontinued. Pioglitazone 30mg daily PO was added  and patient was deemed medically stable to be transferred to medicine floor.     Seen by Endo who gave him 40 units of tresiba u-10 and provided, and has been provided w/ a free sample so does not need insulin prescription,. Pioglitazone, one touch verio test strips, lancets, and pen needles sent to [facility]. will follow up with dr hope tuesday next week 32 yo M with PMH of DM presents to ED for hyperglycemia and weakness. Pt was in the hospital a couple of weeks ago for hyperglycemia and was dc home on metformin BID. However, he states it causes him constipation and he only takes it once a day. For the past few days he has had weakness and feels more fatigued. No polyuria or polydipsia. Pt had one episode of emesis yesterday and one today. No abdominal pain. No fevers or chills. In the Ed given bolus IVF, started on insulin drip and ns. potassium 6.6, bicarb 12, AG 28 BHB 7.5.     Admitted to MICU for treatment of DKA where he was placed on insulin drip at 7 units per hour and D5 1/2NS at 150 ml/hour. Anion Gap steadily improve and serum bicarbonate increased. He was then transitioned to subq insulin at 30 glargine, and 10/10/10 of lispro, insulin drip was discontinued. Pioglitazone 30mg daily PO was added  and patient was deemed medically stable to be transferred to medicine floor.     Seen by Endo who gave him 40 units of tresiba u-10 and has been provided w/ a free sample so does not need insulin prescription. Pioglitazone, one touch verio test strips, lancets, and pen needles sent to [facility]. Patient will follow up with dr Shanonn, on Tuesday next week 32 yo M with PMH of DM presents to ED for hyperglycemia and weakness. Pt was in the hospital a couple of weeks ago for hyperglycemia and was dc home on metformin BID. However, he states it causes him constipation and he only takes it once a day. For the past few days he has had weakness and feels more fatigued. No polyuria or polydipsia. Pt had one episode of emesis yesterday and one today. No abdominal pain. No fevers or chills. In the Ed given bolus IVF, started on insulin drip and ns. potassium 6.6, bicarb 12, AG 28 BHB 7.5.     Admitted to MICU for treatment of DKA where he was placed on insulin drip at 7 units per hour and D5 1/2NS at 150 ml/hour. Anion Gap steadily improve and serum bicarbonate increased. He was then transitioned to subq insulin at 30 glargine, and 10/10/10 of lispro, insulin drip was discontinued. Pioglitazone 30mg daily PO was added  and patient was deemed medically stable to be transferred to medicine floor.     Seen by Endo who gave him 40 units of tresiba u-10 and has been provided w/ a free sample so does not need insulin prescription. Pioglitazone, one touch verio test strips, lancets, and pen needles sent to [facility]. Patient will follow up with dr Shannon, on Tuesday next week    Vital Signs Last 24 Hrs  T(C): 36.2 (17 Jul 2021 08:00), Max: 36.7 (17 Jul 2021 00:00)  T(F): 97.1 (17 Jul 2021 08:00), Max: 98 (17 Jul 2021 00:00)  HR: 72 (17 Jul 2021 08:00) (72 - 77)  BP: 117/59 (17 Jul 2021 08:00) (112/58 - 126/56)  BP(mean): --  RR: 20 (17 Jul 2021 08:00) (20 - 20)  SpO2: 95% (16 Jul 2021 13:55) (95% - 95%) 32 yo M with PMH of DM presents to ED for hyperglycemia and weakness. Pt was in the hospital a couple of weeks ago for hyperglycemia and was dc home on metformin BID. However, he states it causes him constipation and he only takes it once a day. For the past few days he has had weakness and feels more fatigued. No polyuria or polydipsia. Pt had one episode of emesis yesterday and one today. No abdominal pain. No fevers or chills. In the Ed given bolus IVF, started on insulin drip and ns. potassium 6.6, bicarb 12, AG 28 BHB 7.5.     Admitted to MICU for treatment of DKA where he was placed on insulin drip at 7 units per hour and D5 1/2NS at 150 ml/hour. Anion Gap steadily improve and serum bicarbonate increased. He was then transitioned to subq insulin at 30 glargine, and 10/10/10 of lispro, insulin drip was discontinued. Pioglitazone 30mg daily PO was added  and patient was deemed medically stable to be transferred to medicine floor.     Seen by Endo who gave him 40 units of tresiba u-10 and has been provided w/ a free sample so does not need insulin prescription. Pioglitazone, one touch verio test strips, lancets, and pen needles sent to [facility]. Patient will follow up with dr Shannon, on Tuesday next week    Vital Signs Last 24 Hrs  T(C): 36.2 (17 Jul 2021 08:00), Max: 36.7 (17 Jul 2021 00:00)  T(F): 97.1 (17 Jul 2021 08:00), Max: 98 (17 Jul 2021 00:00)  HR: 72 (17 Jul 2021 08:00) (72 - 77)  BP: 117/59 (17 Jul 2021 08:00) (112/58 - 126/56)  BP(mean): --  RR: 20 (17 Jul 2021 08:00) (20 - 20)  SpO2: 95% (16 Jul 2021 13:55) (95% - 95%)    Attending addendum: Patient seen and examined. Cleared by endocrinology for discharge. Med rec reviewed. 32 yo M with PMH of DM presents to ED for hyperglycemia and weakness. Pt was in the hospital a couple of weeks ago for hyperglycemia and was dc home on metformin BID. However, he states it causes him constipation and he only takes it once a day. For the past few days he has had weakness and feels more fatigued. No polyuria or polydipsia. Pt had one episode of emesis yesterday and one today. No abdominal pain. No fevers or chills. In the Ed given bolus IVF, started on insulin drip and ns. potassium 6.6, bicarb 12, AG 28 BHB 7.5.     Admitted to MICU for treatment of DKA where he was placed on insulin drip at 7 units per hour and D5 1/2NS at 150 ml/hour. Anion Gap steadily improve and serum bicarbonate increased. He was then transitioned to subq insulin at 30 glargine, and 10/10/10 of lispro, insulin drip was discontinued. Pioglitazone 30mg daily PO was added  and patient was deemed medically stable to be transferred to medicine floor.     Seen by Endo who gave him 40 units of tresiba u-10 and has been provided w/ a free sample so does not need insulin prescription. Pioglitazone, one touch verio test strips, lancets, and pen needles sent to [facility]. Patient will follow up with dr Shannon, on Tuesday next week    Vital Signs Last 24 Hrs  T(C): 36.2 (17 Jul 2021 08:00), Max: 36.7 (17 Jul 2021 00:00)  T(F): 97.1 (17 Jul 2021 08:00), Max: 98 (17 Jul 2021 00:00)  HR: 72 (17 Jul 2021 08:00) (72 - 77)  BP: 117/59 (17 Jul 2021 08:00) (112/58 - 126/56)  BP(mean): --  RR: 20 (17 Jul 2021 08:00) (20 - 20)  SpO2: 95% (16 Jul 2021 13:55) (95% - 95%)

## 2021-07-22 DIAGNOSIS — N17.9 ACUTE KIDNEY FAILURE, UNSPECIFIED: ICD-10-CM

## 2021-07-22 DIAGNOSIS — E66.9 OBESITY, UNSPECIFIED: ICD-10-CM

## 2021-07-22 DIAGNOSIS — R73.9 HYPERGLYCEMIA, UNSPECIFIED: ICD-10-CM

## 2021-07-22 DIAGNOSIS — E11.10 TYPE 2 DIABETES MELLITUS WITH KETOACIDOSIS WITHOUT COMA: ICD-10-CM

## 2021-07-22 DIAGNOSIS — E87.5 HYPERKALEMIA: ICD-10-CM

## 2021-07-22 DIAGNOSIS — E86.0 DEHYDRATION: ICD-10-CM

## 2021-07-22 DIAGNOSIS — G47.33 OBSTRUCTIVE SLEEP APNEA (ADULT) (PEDIATRIC): ICD-10-CM

## 2022-06-30 NOTE — ED ADULT NURSE NOTE - NS ED NURSE RECORD ANOTHER HT AND WT
Impression: Age-related nuclear cataract, bilateral: H25.13. Plan: Continue to monitor. No surgical treatment recommended.
Impression: Dry eye syndrome of bilateral lacrimal glands: H04.123. Plan: Recommend OTC artificial tear use 2-4x daily w/ emphasis on QAM and QHS doses. Discussed possible improvement with thicker gel or ointment QHS. Recommend warm compress w/ lid massage. If symptoms continue/worsen will consider prescription drug therapy.
Impression: Presbyopia: H52.4. Plan: Dispensed new glasses Rx today and discussed changes and possible adaptation period. Patient to call if any issues with new glasses occur.
Yes

## 2025-02-12 NOTE — ED ADULT NURSE NOTE - BEFAST SCREENING
[de-identified] : 2 12 2024: Sinus Rhythm  WITHIN NORMAL LIMITS 5 30 2023 Sinus  Rhythm   Normal limits  12 28 2021:  Sinus  Rhythm  -Left atrial enlargement.  non specific T-waves.    [de-identified] : Jan 2022: 4 weeks monitor. no significant egvnets. symptosm did not correlate with any significane vent./s  [de-identified] : feb 2022:  nuclear stress test:  11 METs.  107% MOPHR.  no cardiac symptoms. Levin score = 10 . Low risk.  No ischemia.  [de-identified] : feb 2022:  LVEf 62%.  Norml RV no significant valvular abnormality  [de-identified] : sept 2023:  LDL : 92.  HDL: 61.  Total: 183.  Tgs: 183  ipo priontein : 300 Negative